# Patient Record
Sex: FEMALE | NOT HISPANIC OR LATINO | Employment: FULL TIME | URBAN - METROPOLITAN AREA
[De-identification: names, ages, dates, MRNs, and addresses within clinical notes are randomized per-mention and may not be internally consistent; named-entity substitution may affect disease eponyms.]

---

## 2020-10-20 ENCOUNTER — OFFICE VISIT (OUTPATIENT)
Dept: ENDOCRINOLOGY | Facility: CLINIC | Age: 35
End: 2020-10-20

## 2020-10-20 VITALS
HEIGHT: 61 IN | TEMPERATURE: 97.8 F | OXYGEN SATURATION: 99 % | DIASTOLIC BLOOD PRESSURE: 86 MMHG | BODY MASS INDEX: 32.02 KG/M2 | SYSTOLIC BLOOD PRESSURE: 122 MMHG | WEIGHT: 169.6 LBS | HEART RATE: 114 BPM

## 2020-10-20 DIAGNOSIS — IMO0002 DIABETES MELLITUS TYPE 2, UNCONTROLLED, WITH COMPLICATIONS: Primary | ICD-10-CM

## 2020-10-20 LAB — HBA1C MFR BLD: 8.5 %

## 2020-10-20 PROCEDURE — 99213 OFFICE O/P EST LOW 20 MIN: CPT | Performed by: INTERNAL MEDICINE

## 2020-10-20 PROCEDURE — 83036 HEMOGLOBIN GLYCOSYLATED A1C: CPT | Performed by: INTERNAL MEDICINE

## 2020-10-20 RX ORDER — INSULIN GLARGINE AND LIXISENATIDE 100; 33 U/ML; UG/ML
60 INJECTION, SOLUTION SUBCUTANEOUS DAILY
Qty: 3 PEN | Refills: 5 | Status: SHIPPED | OUTPATIENT
Start: 2020-10-20 | End: 2021-01-25

## 2020-10-20 RX ORDER — DAPAGLIFLOZIN 10 MG/1
1 TABLET, FILM COATED ORAL DAILY
Qty: 30 TABLET | Refills: 5 | Status: SHIPPED | OUTPATIENT
Start: 2020-10-20 | End: 2021-01-25

## 2020-10-20 RX ORDER — DAPAGLIFLOZIN 10 MG/1
1 TABLET, FILM COATED ORAL DAILY
COMMUNITY
Start: 2020-08-25 | End: 2020-10-20 | Stop reason: SDUPTHER

## 2020-10-20 RX ORDER — INSULIN GLARGINE AND LIXISENATIDE 100; 33 U/ML; UG/ML
INJECTION, SOLUTION SUBCUTANEOUS
COMMUNITY
Start: 2020-09-24 | End: 2020-10-20 | Stop reason: SDUPTHER

## 2020-10-20 NOTE — ASSESSMENT & PLAN NOTE
.a1c is not at goal. She made changes in her diet plan about 3 weeks ago and her numbers are better since then. Let's see how that works

## 2020-10-20 NOTE — PROGRESS NOTES
"     Office Note      Date: 10/20/2020  Patient Name: Lamar Webster  MRN: [id number]  : 1985    Chief Complaint   Patient presents with   • Follow-up   • Diabetes       History of Present Illness:   Lamar Webster is a 34 y.o. female who presents for Diabetes type 2. Diagnosed in: . Treated in past with oral agents. Current treatments: soliqua an dfarxiga . Number of insulin shots per day: 1. Checks blood sugar 4 times a day. Has low blood sugar: rare.     Last A1c:  Hemoglobin A1C   Date Value Ref Range Status   10/20/2020 8.5 % Final       Changes in health since last visit: none . Last eye exam due .    Subjective      Diabetic Complications:  Eyes: No  Kidneys: No  Feet: No  Heart: No    Diet and Exercise:  Meals per day: 3  Minutes of exercise per week: 150 mins.    Review of Systems:   Review of Systems   Constitutional: Negative.    HENT: Negative.    Eyes: Negative.    Respiratory: Negative.    Cardiovascular: Negative.    Gastrointestinal: Negative.    Endocrine: Negative.    Genitourinary: Negative.    Musculoskeletal: Negative.    Skin: Negative.    Allergic/Immunologic: Negative.    Neurological: Negative.    Hematological: Negative.    Psychiatric/Behavioral: Negative.        The following portions of the patient's history were reviewed and updated as appropriate: allergies, current medications, past family history, past medical history, past social history, past surgical history and problem list.    Objective     Visit Vitals  /86 (BP Location: Left arm, Patient Position: Sitting, Cuff Size: Adult)   Pulse 114   Temp 97.8 °F (36.6 °C)   Ht 154.9 cm (61\")   Wt 76.9 kg (169 lb 9.6 oz)   SpO2 99%   BMI 32.05 kg/m²       Labs:    CMP  No results found for: GLUCOSE, BUN, CREATININE, EGFRIFNONA, EGFRIFAFRI, BCR, K, CO2, CALCIUM, PROTENTOTREF, LABIL2, BILIRUBIN, AST, ALT     CBC w/DIFF  No results found for: WBC, RBC, HGB, HCT, MCV, MCH, MCHC, RDW, RDWSD, MPV, PLT, NEUTRORELPCT, " LYMPHORELPCT, MONORELPCT, EOSRELPCT, BASORELPCT, AUTOIGPER, NEUTROABS, LYMPHSABS, MONOSABS, EOSABS, BASOSABS, AUTOIGNUM, NRBC    Physical Exam:  Physical Exam  Constitutional:       Appearance: Normal appearance.   HENT:      Head: Normocephalic and atraumatic.   Neurological:      General: No focal deficit present.      Mental Status: She is alert and oriented to person, place, and time. Mental status is at baseline.   Psychiatric:         Mood and Affect: Mood normal.         Thought Content: Thought content normal.         Judgment: Judgment normal.          Assessment / Plan      Assessment & Plan:  Problem List Items Addressed This Visit        Endocrine    Diabetes mellitus type 2, uncontrolled, with complications (CMS/[facility]) - Primary    Current Assessment & Plan     .a1c is not at goal. She made changes in her diet plan about 3 weeks ago and her numbers are better since then. Let's see how that works          Relevant Medications    Farxiga 10 MG tablet    Soliqua 100-33 UNT-MCG/ML solution pen-injector injection    Other Relevant Orders    POC Glycosylated Hemoglobin (Hb A1C) (Completed)           Bharathi Powell MD   10/20/2020

## 2020-11-02 RX ORDER — BLOOD-GLUCOSE METER
KIT MISCELLANEOUS
Qty: 60 EACH | Refills: 5 | Status: SHIPPED | OUTPATIENT
Start: 2020-11-02 | End: 2021-04-12

## 2020-11-20 ENCOUNTER — TELEPHONE (OUTPATIENT)
Dept: ENDOCRINOLOGY | Facility: CLINIC | Age: 35
End: 2020-11-20

## 2020-11-20 NOTE — TELEPHONE ENCOUNTER
ALDEN FROM [facility] [facility] [employer] STATED PA HAS BEEN DELETED FOR FARXIGA FOR PT. PLEASE CALL [phone] REF: [id number].

## 2020-12-10 ENCOUNTER — TELEPHONE (OUTPATIENT)
Dept: ENDOCRINOLOGY | Facility: CLINIC | Age: 35
End: 2020-12-10

## 2021-01-22 ENCOUNTER — TELEPHONE (OUTPATIENT)
Dept: ENDOCRINOLOGY | Facility: CLINIC | Age: 36
End: 2021-01-22

## 2021-01-22 NOTE — TELEPHONE ENCOUNTER
Deniedon January 21  Your PA request has been denied. Additional information will be provided in the denial communication. (Message 1115) Your PA request has been denied. Additional information will be provided in the denial communication. (Message 1115)  Drug  Soliqua 100-33UNT-MCG/ML pen-injectors

## 2021-01-25 ENCOUNTER — OFFICE VISIT (OUTPATIENT)
Dept: ENDOCRINOLOGY | Facility: CLINIC | Age: 36
End: 2021-01-25

## 2021-01-25 VITALS
WEIGHT: 160 LBS | DIASTOLIC BLOOD PRESSURE: 78 MMHG | HEIGHT: 61 IN | OXYGEN SATURATION: 98 % | TEMPERATURE: 97.5 F | HEART RATE: 98 BPM | BODY MASS INDEX: 30.21 KG/M2 | SYSTOLIC BLOOD PRESSURE: 126 MMHG

## 2021-01-25 DIAGNOSIS — IMO0002 DIABETES MELLITUS TYPE 2, UNCONTROLLED, WITH COMPLICATIONS: Primary | ICD-10-CM

## 2021-01-25 LAB
EXPIRATION DATE: NORMAL
HBA1C MFR BLD: 6.4 %
Lab: NORMAL

## 2021-01-25 PROCEDURE — 99213 OFFICE O/P EST LOW 20 MIN: CPT | Performed by: INTERNAL MEDICINE

## 2021-01-25 PROCEDURE — 83036 HEMOGLOBIN GLYCOSYLATED A1C: CPT | Performed by: INTERNAL MEDICINE

## 2021-01-25 RX ORDER — DAPAGLIFLOZIN 10 MG/1
1 TABLET, FILM COATED ORAL DAILY
Qty: 30 TABLET | Refills: 5 | Status: SHIPPED | OUTPATIENT
Start: 2021-01-25 | End: 2021-08-06 | Stop reason: SDUPTHER

## 2021-01-25 NOTE — PROGRESS NOTES
"     Office Note      Date: 2021  Patient Name: Lamar Webster  MRN: [id number]  : 1985    Chief Complaint   Patient presents with   • Diabetes       History of Present Illness:   Lamar Webster is a 35 y.o. female who presents for Diabetes - she is on soliqua and farxiga.  She is being really strict with her diet and is not really needing soliqua every day.  Takes it maybe 3 days per week  She is taking farxiga every day.  Insurance is giving her a hard time with both of these       Last A1c:  Hemoglobin A1C   Date Value Ref Range Status   2021 6.4 % Final       Changes in health since last visit: following her diet closely . Last eye- check is due     Subjective          Review of Systems:   Review of Systems   Constitutional: Negative.    HENT: Negative.    Eyes: Negative.    Respiratory: Negative.    All other systems reviewed and are negative.      The following portions of the patient's history were reviewed and updated as appropriate: allergies, current medications, past family history, past medical history, past social history, past surgical history and problem list.    Objective     Visit Vitals  /78 (BP Location: Left arm, Patient Position: Sitting, Cuff Size: Adult)   Pulse 98   Temp 97.5 °F (36.4 °C) (Infrared)   Ht 154.9 cm (61\")   Wt 72.6 kg (160 lb)   SpO2 98%   BMI 30.23 kg/m²       Labs:    CMP  No results found for: GLUCOSE, BUN, CREATININE, EGFRIFNONA, EGFRIFAFRI, BCR, K, CO2, CALCIUM, PROTENTOTREF, LABIL2, BILIRUBIN, AST, ALT     CBC w/DIFF  No results found for: WBC, RBC, HGB, HCT, MCV, MCH, MCHC, RDW, RDWSD, MPV, PLT, NEUTRORELPCT, LYMPHORELPCT, MONORELPCT, EOSRELPCT, BASORELPCT, AUTOIGPER, NEUTROABS, LYMPHSABS, MONOSABS, EOSABS, BASOSABS, AUTOIGNUM, NRBC    Physical Exam:  Physical Exam  Vitals signs reviewed.   Constitutional:       Appearance: Normal appearance.   HENT:      Head: Normocephalic and atraumatic.   Neurological:      Mental Status: She is alert. "   Psychiatric:         Mood and Affect: Mood normal.         Thought Content: Thought content normal.         Judgment: Judgment normal.          Assessment / Plan      Assessment & Plan:  Problem List Items Addressed This Visit        Other    Diabetes mellitus type 2, uncontrolled, with complications (CMS/[facility]) - Primary    Current Assessment & Plan      Diabetes is much improved.  Will change meds since her diet is so good          Relevant Medications    Dapagliflozin Propanediol (Farxiga) 10 MG tablet    Semaglutide, 1 MG/DOSE, (OZEMPIC) 2 MG/1.5ML solution pen-injector    Other Relevant Orders    POC Glycosylated Hemoglobin (Hb A1C) (Completed)           Bharathi Powell MD   01/25/2021

## 2021-01-26 RX ORDER — SEMAGLUTIDE 1.34 MG/ML
0.5 INJECTION, SOLUTION SUBCUTANEOUS WEEKLY
Qty: 2 ML | Refills: 2 | Status: SHIPPED | OUTPATIENT
Start: 2021-01-26 | End: 2021-04-19

## 2021-01-26 NOTE — TELEPHONE ENCOUNTER
MARIELOS CALLED REGARDING RX FOR OZEMPIC. THE 1MG/ DOSE WAS CALLED IN BUT THE DIRECTIONS ARE FOR THE .5/ DOSE. PLEASE SEND IN RX WITH CORRECT DOSE.

## 2021-04-12 RX ORDER — BLOOD-GLUCOSE METER
KIT MISCELLANEOUS
Qty: 400 EACH | Refills: 1 | Status: SHIPPED | OUTPATIENT
Start: 2021-04-12

## 2021-04-19 RX ORDER — SEMAGLUTIDE 1.34 MG/ML
INJECTION, SOLUTION SUBCUTANEOUS
Qty: 1.5 ML | Refills: 5 | Status: SHIPPED | OUTPATIENT
Start: 2021-04-19 | End: 2021-10-04

## 2021-08-06 DIAGNOSIS — IMO0002 DIABETES MELLITUS TYPE 2, UNCONTROLLED, WITH COMPLICATIONS: ICD-10-CM

## 2021-08-06 RX ORDER — DAPAGLIFLOZIN 10 MG/1
1 TABLET, FILM COATED ORAL DAILY
Qty: 30 TABLET | Refills: 2 | Status: SHIPPED | OUTPATIENT
Start: 2021-08-06 | End: 2021-11-29

## 2021-08-06 NOTE — TELEPHONE ENCOUNTER
PT CALLED REQUESTING A REFILL OF FARXIGA TO BE SENT IN TO [facility]  PLEASE AND THANK YOU

## 2021-10-04 RX ORDER — SEMAGLUTIDE 1.34 MG/ML
INJECTION, SOLUTION SUBCUTANEOUS
Qty: 1.5 ML | Refills: 0 | Status: SHIPPED | OUTPATIENT
Start: 2021-10-04 | End: 2021-11-01

## 2021-11-01 RX ORDER — SEMAGLUTIDE 1.34 MG/ML
0.5 INJECTION, SOLUTION SUBCUTANEOUS WEEKLY
Qty: 1.5 ML | Refills: 1 | Status: SHIPPED | OUTPATIENT
Start: 2021-11-01 | End: 2021-12-27

## 2021-11-27 DIAGNOSIS — IMO0002 DIABETES MELLITUS TYPE 2, UNCONTROLLED, WITH COMPLICATIONS: ICD-10-CM

## 2021-11-29 RX ORDER — DAPAGLIFLOZIN 10 MG/1
TABLET, FILM COATED ORAL
Qty: 30 TABLET | Refills: 2 | Status: SHIPPED | OUTPATIENT
Start: 2021-11-29 | End: 2022-02-17

## 2021-12-27 RX ORDER — SEMAGLUTIDE 1.34 MG/ML
INJECTION, SOLUTION SUBCUTANEOUS
Qty: 1.5 ML | Refills: 0 | Status: SHIPPED | OUTPATIENT
Start: 2021-12-27 | End: 2022-01-21

## 2022-01-18 ENCOUNTER — TELEPHONE (OUTPATIENT)
Dept: ENDOCRINOLOGY | Facility: CLINIC | Age: 37
End: 2022-01-18

## 2022-01-21 RX ORDER — SEMAGLUTIDE 1.34 MG/ML
INJECTION, SOLUTION SUBCUTANEOUS
Qty: 1.5 ML | Refills: 0 | Status: SHIPPED | OUTPATIENT
Start: 2022-01-21 | End: 2022-02-17 | Stop reason: SDUPTHER

## 2022-02-17 ENCOUNTER — OFFICE VISIT (OUTPATIENT)
Dept: ENDOCRINOLOGY | Facility: CLINIC | Age: 37
End: 2022-02-17

## 2022-02-17 VITALS
SYSTOLIC BLOOD PRESSURE: 116 MMHG | BODY MASS INDEX: 31.15 KG/M2 | OXYGEN SATURATION: 97 % | DIASTOLIC BLOOD PRESSURE: 79 MMHG | HEART RATE: 98 BPM | WEIGHT: 165 LBS | HEIGHT: 61 IN

## 2022-02-17 DIAGNOSIS — E11.65 UNCONTROLLED TYPE 2 DIABETES MELLITUS WITH HYPERGLYCEMIA: Primary | ICD-10-CM

## 2022-02-17 LAB
EXPIRATION DATE: ABNORMAL
EXPIRATION DATE: NORMAL
GLUCOSE BLDC GLUCOMTR-MCNC: 144 MG/DL (ref 70–130)
HBA1C MFR BLD: 8.5 %
Lab: ABNORMAL
Lab: NORMAL

## 2022-02-17 PROCEDURE — 83036 HEMOGLOBIN GLYCOSYLATED A1C: CPT | Performed by: INTERNAL MEDICINE

## 2022-02-17 PROCEDURE — 82947 ASSAY GLUCOSE BLOOD QUANT: CPT | Performed by: INTERNAL MEDICINE

## 2022-02-17 PROCEDURE — 99214 OFFICE O/P EST MOD 30 MIN: CPT | Performed by: INTERNAL MEDICINE

## 2022-02-17 RX ORDER — INSULIN GLARGINE 100 [IU]/ML
30 INJECTION, SOLUTION SUBCUTANEOUS NIGHTLY
Qty: 15 ML | Refills: 3 | Status: SHIPPED | OUTPATIENT
Start: 2022-02-17 | End: 2022-07-06

## 2022-02-17 RX ORDER — INSULIN GLARGINE 100 [IU]/ML
INJECTION, SOLUTION SUBCUTANEOUS
COMMUNITY
Start: 2022-01-10 | End: 2022-02-17 | Stop reason: SDUPTHER

## 2022-02-17 RX ORDER — SEMAGLUTIDE 1.34 MG/ML
0.5 INJECTION, SOLUTION SUBCUTANEOUS WEEKLY
Qty: 1.5 ML | Refills: 5 | Status: SHIPPED | OUTPATIENT
Start: 2022-02-17 | End: 2022-07-13

## 2022-02-17 RX ORDER — DIPHENHYDRAMINE HYDROCHLORIDE 25 MG/1
5 TABLET ORAL DAILY
COMMUNITY

## 2022-02-17 NOTE — ASSESSMENT & PLAN NOTE
Diabetes is worsening.   i feel ok with resuming sglt2 but only if she is on insulin and monitors ketones   Diabetes will be reassessed in 3 months.

## 2022-02-17 NOTE — PROGRESS NOTES
"     Office Note      Date: 2022  Patient Name: Lamar Webster  MRN: [id number]  : 1985    Chief Complaint   Patient presents with   • Diabetes       History of Present Illness:   Lamar Webster is a 36 y.o. female who presents for Diabetes - type 2.   she had been doing well with farxiga and ozempic  But then ended up in the hospital with normoglycemic DKA. She was in the hospital for 3 days. Sent home on insulin and ozempic but her blood sugars are still high   Last A1c:6.4  Hemoglobin A1C   Date Value Ref Range Status   2022 8.5 % Final       Changes in health since last visit: see above . Last eye exam up to date.    Subjective          Review of Systems:   Review of Systems   Constitutional: Positive for unexpected weight change.       The following portions of the patient's history were reviewed and updated as appropriate: allergies, current medications, past family history, past medical history, past social history, past surgical history and problem list.    Objective     Visit Vitals  /79   Pulse 98   Ht 154.9 cm (61\")   Wt 74.8 kg (165 lb)   SpO2 97%   BMI 31.18 kg/m²       Labs:    CMP  No results found for: GLUCOSE, BUN, CREATININE, EGFRIFNONA, EGFRIFAFRI, BCR, K, CO2, CALCIUM, PROTENTOTREF, LABIL2, BILIRUBIN, AST, ALT     CBC w/DIFF  No results found for: WBC, RBC, HGB, HCT, MCV, MCH, MCHC, RDW, RDWSD, MPV, PLT, NEUTRORELPCT, LYMPHORELPCT, MONORELPCT, EOSRELPCT, BASORELPCT, AUTOIGPER, NEUTROABS, LYMPHSABS, MONOSABS, EOSABS, BASOSABS, AUTOIGNUM, NRBC    Physical Exam:  Physical Exam  Constitutional:       Appearance: Normal appearance.   Neurological:      Mental Status: She is alert.   Psychiatric:         Mood and Affect: Mood normal.         Thought Content: Thought content normal.         Judgment: Judgment normal.          Assessment / Plan      Assessment & Plan:  Problem List Items Addressed This Visit        Other    Uncontrolled type 2 diabetes mellitus with hyperglycemia " ([facility]) - Primary    Current Assessment & Plan     Diabetes is worsening.   i feel ok with resuming sglt2 but only if she is on insulin and monitors ketones   Diabetes will be reassessed in 3 months.         Relevant Medications    empagliflozin (Jardiance) 10 MG tablet tablet    Insulin Glargine (BASAGLAR KWIKPEN) 100 UNIT/ML injection pen    Semaglutide,0.25 or 0.5MG/DOS, (Ozempic, 0.25 or 0.5 MG/DOSE,) 2 MG/1.5ML solution pen-injector    Other Relevant Orders    POC Glucose, Blood (Completed)    POC Glycosylated Hemoglobin (Hb A1C) (Completed)           Bharathi Powell MD   02/17/2022

## 2022-03-01 ENCOUNTER — PRIOR AUTHORIZATION (OUTPATIENT)
Dept: ENDOCRINOLOGY | Facility: CLINIC | Age: 37
End: 2022-03-01

## 2022-03-01 RX ORDER — SEMAGLUTIDE 1.34 MG/ML
INJECTION, SOLUTION SUBCUTANEOUS
Qty: 1.5 ML | Refills: 5 | OUTPATIENT
Start: 2022-03-01

## 2022-03-01 NOTE — TELEPHONE ENCOUNTER
PT CALLED STATING THAT JARDIANCE REQUIRES A PA. PLEASE LOOK INTO THIS. THANK YOU    PT STATED SHE DOES NOT NEED A REFILL OF OZEMPIC SENT IN.

## 2022-03-01 NOTE — TELEPHONE ENCOUNTER
AVILA HARRY Key: [id number] - PA Case ID: [id number] - Rx #: [id number] help? Call us at [phone]  Outcome  Approvedtoday  Your PA request has been approved. Additional information will be provided in the approval communication. (Message 1120)  Drug  Jardiance 10MG tablets  Form  [employer] Electronic PA Form (2017 NCPDP)  Original Claim Info  76 STEP THERAPY CRITERIA NOT MET.MUST USE METFORMIN THERAPY FIRST FORTYPE 2 DIABETES - FOR OVERRIDE HAVE [facility] [phone] REQUIRES PRIOR AUTHORIZATION

## 2022-03-15 ENCOUNTER — TELEPHONE (OUTPATIENT)
Dept: ENDOCRINOLOGY | Facility: CLINIC | Age: 37
End: 2022-03-15

## 2022-05-26 ENCOUNTER — OFFICE VISIT (OUTPATIENT)
Dept: ENDOCRINOLOGY | Facility: CLINIC | Age: 37
End: 2022-05-26

## 2022-05-26 VITALS
SYSTOLIC BLOOD PRESSURE: 114 MMHG | WEIGHT: 168 LBS | OXYGEN SATURATION: 100 % | BODY MASS INDEX: 31.72 KG/M2 | DIASTOLIC BLOOD PRESSURE: 73 MMHG | HEIGHT: 61 IN | HEART RATE: 98 BPM

## 2022-05-26 DIAGNOSIS — E11.65 UNCONTROLLED TYPE 2 DIABETES MELLITUS WITH HYPERGLYCEMIA: Primary | ICD-10-CM

## 2022-05-26 LAB
EXPIRATION DATE: ABNORMAL
EXPIRATION DATE: NORMAL
GLUCOSE BLDC GLUCOMTR-MCNC: 223 MG/DL (ref 70–130)
HBA1C MFR BLD: 7.8 %
Lab: ABNORMAL
Lab: NORMAL

## 2022-05-26 PROCEDURE — 99213 OFFICE O/P EST LOW 20 MIN: CPT | Performed by: INTERNAL MEDICINE

## 2022-05-26 PROCEDURE — 83036 HEMOGLOBIN GLYCOSYLATED A1C: CPT | Performed by: INTERNAL MEDICINE

## 2022-05-26 PROCEDURE — 82947 ASSAY GLUCOSE BLOOD QUANT: CPT | Performed by: INTERNAL MEDICINE

## 2022-05-26 NOTE — PROGRESS NOTES
"     Office Note      Date: 2022  Patient Name: Lamar Webster  MRN: [id number]  : 1985    Chief Complaint   Patient presents with   • Diabetes       History of Present Illness:   Lamar Webster is a 36 y.o. female who presents for Diabetes - ON SGLT2, GLP1 AND INSULIN  HAD COVID IN MARCH.    BG CHECKS ARE CHECKED SOMETIMES  WILL DO BETTER ONCE SCHOOL  IS OUT.  NO SERIOUS HYPOS        Last A1c:  Hemoglobin A1C   Date Value Ref Range Status   2022 7.8 % Final   2022 8.5 (H) <5.7 % Final       Changes in health since last visit: SEE ABOVE . Last eye exam SUMMER 2021.    Subjective        Review of Systems:   Review of Systems   Constitutional: Negative.    HENT: Negative.    Respiratory: Negative.        The following portions of the patient's history were reviewed and updated as appropriate: allergies, current medications, past family history, past medical history, past social history, past surgical history and problem list.    Objective     Visit Vitals  /73   Pulse 98   Ht 154.9 cm (61\")   Wt 76.2 kg (168 lb)   SpO2 100%   BMI 31.74 kg/m²       Labs:    CMP  Lab Results   Component Value Date    GLUCOSE 134 (H) 2022    BUN 3 (L) 01/10/2022    CREATININE 0.35 (L) 01/10/2022    EGFRIFNONA >60 01/10/2022    EGFRIFAFRI >60 01/10/2022    BCR 9 01/10/2022    K 4.0 01/10/2022    CO2 21 (L) 01/10/2022    CALCIUM 8.5 (L) 01/10/2022    AST 7 (L) 2022    ALT 6 (L) 2022        CBC w/DIFF  Lab Results   Component Value Date    WBC 7.30 2022    RBC 4.07 2022    HGB 12.8 2022    HCT 36.5 2022    MCV 90 2022    MCH 31.4 2022    MCHC 35.1 2022    RDW 12.3 2022    MPV 8.9 2022     2022    NEUTRORELPCT 55.0 2022    LYMPHORELPCT 37.0 2022    MONORELPCT 7.0 2022    EOSRELPCT 1.0 2022    BASORELPCT 0.0 2022    AUTOIGPER 0.0 2022    NEUTROABS 3.96 2022    LYMPHSABS 2.70 2022 "    MONOSABS 0.52 01/09/2022    EOSABS 0.08 01/09/2022    BASOSABS 0.02 01/09/2022    AUTOIGNUM 0.02 01/09/2022    NRBC 0.0 01/09/2022       Physical Exam:  Physical Exam  Vitals reviewed.   Constitutional:       Appearance: Normal appearance. She is normal weight.   Cardiovascular:      Pulses:           Dorsalis pedis pulses are 2+ on the right side and 2+ on the left side.        Posterior tibial pulses are 2+ on the right side and 2+ on the left side.   Musculoskeletal:      Right foot: Normal range of motion. No deformity, bunion, Charcot foot, foot drop or prominent metatarsal heads.      Left foot: Normal range of motion. No deformity, bunion, Charcot foot, foot drop or prominent metatarsal heads.   Feet:      Right foot:      Protective Sensation: 5 sites tested. 5 sites sensed.      Skin integrity: Skin integrity normal.      Toenail Condition: Right toenails are normal.      Left foot:      Protective Sensation: 5 sites tested. 5 sites sensed.      Skin integrity: Skin integrity normal.      Toenail Condition: Left toenails are normal.      Comments: Diabetic Foot Exam Performed and Monofilament Test Performed    Neurological:      Mental Status: She is alert.          Assessment / Plan      Assessment & Plan:  Problem List Items Addressed This Visit        Other    Uncontrolled type 2 diabetes mellitus with hyperglycemia (HCC) - Primary    Overview     HAD NORMOGLYCEMIC DKA WITH JARDIANCE WHEN SHE WAS NOT ON INSULIN. HAS NOT HAD IT WHEN ON INSULIN            Current Assessment & Plan     Diabetes is improving with treatment.   Continue current treatment regimen.  Diabetes will be reassessed in 6 months.           Relevant Medications    empagliflozin (Jardiance) 10 MG tablet tablet    Insulin Glargine (BASAGLAR KWIKPEN) 100 UNIT/ML injection pen    Semaglutide,0.25 or 0.5MG/DOS, (Ozempic, 0.25 or 0.5 MG/DOSE,) 2 MG/1.5ML solution pen-injector    Other Relevant Orders    POC Glucose, Blood (Completed)     POC Glycosylated Hemoglobin (Hb A1C) (Completed)           Bharathi Powell MD   05/26/2022

## 2022-07-06 RX ORDER — INSULIN GLARGINE 100 [IU]/ML
INJECTION, SOLUTION SUBCUTANEOUS
Qty: 15 ML | Refills: 3 | Status: SHIPPED | OUTPATIENT
Start: 2022-07-06 | End: 2022-12-22

## 2022-07-13 RX ORDER — SEMAGLUTIDE 1.34 MG/ML
INJECTION, SOLUTION SUBCUTANEOUS
Qty: 1.5 ML | Refills: 5 | Status: SHIPPED | OUTPATIENT
Start: 2022-07-13 | End: 2023-02-15 | Stop reason: SDUPTHER

## 2022-07-22 RX ORDER — EMPAGLIFLOZIN 10 MG/1
TABLET, FILM COATED ORAL
Qty: 30 TABLET | Refills: 5 | Status: SHIPPED | OUTPATIENT
Start: 2022-07-22 | End: 2023-01-31

## 2022-12-22 RX ORDER — INSULIN GLARGINE 100 [IU]/ML
INJECTION, SOLUTION SUBCUTANEOUS
Qty: 15 ML | Refills: 3 | Status: SHIPPED | OUTPATIENT
Start: 2022-12-22 | End: 2023-03-21 | Stop reason: SDUPTHER

## 2023-01-31 RX ORDER — EMPAGLIFLOZIN 10 MG/1
TABLET, FILM COATED ORAL
Qty: 30 TABLET | Refills: 0 | Status: SHIPPED | OUTPATIENT
Start: 2023-01-31 | End: 2023-03-13

## 2023-02-15 RX ORDER — SEMAGLUTIDE 1.34 MG/ML
INJECTION, SOLUTION SUBCUTANEOUS
Qty: 1.5 ML | Refills: 1 | Status: SHIPPED | OUTPATIENT
Start: 2023-02-15 | End: 2023-03-21 | Stop reason: DRUGHIGH

## 2023-02-17 ENCOUNTER — PRIOR AUTHORIZATION (OUTPATIENT)
Dept: ENDOCRINOLOGY | Facility: CLINIC | Age: 38
End: 2023-02-17
Payer: COMMERCIAL

## 2023-02-17 NOTE — TELEPHONE ENCOUNTER
AVILA MCDONALD Key: [id number] - PA Case ID: [id number] help? Call us at [phone]  Outcome  Approvedon February 16  Your PA request has been approved. Additional information will be provided in the approval communication. (Message 1145)  Drug  Jardiance 10MG tablets  Form  "[employer] Electronic PA Form (2017 NCPDP)

## 2023-03-13 RX ORDER — EMPAGLIFLOZIN 10 MG/1
TABLET, FILM COATED ORAL
Qty: 30 TABLET | Refills: 0 | Status: SHIPPED | OUTPATIENT
Start: 2023-03-13 | End: 2023-03-21 | Stop reason: SDUPTHER

## 2023-03-21 ENCOUNTER — OFFICE VISIT (OUTPATIENT)
Dept: ENDOCRINOLOGY | Facility: CLINIC | Age: 38
End: 2023-03-21
Payer: COMMERCIAL

## 2023-03-21 VITALS
SYSTOLIC BLOOD PRESSURE: 118 MMHG | HEART RATE: 96 BPM | BODY MASS INDEX: 31.72 KG/M2 | DIASTOLIC BLOOD PRESSURE: 78 MMHG | HEIGHT: 61 IN | OXYGEN SATURATION: 98 % | WEIGHT: 168 LBS

## 2023-03-21 DIAGNOSIS — E11.65 UNCONTROLLED TYPE 2 DIABETES MELLITUS WITH HYPERGLYCEMIA: Primary | ICD-10-CM

## 2023-03-21 LAB
EXPIRATION DATE: ABNORMAL
EXPIRATION DATE: NORMAL
GLUCOSE BLDC GLUCOMTR-MCNC: 136 MG/DL (ref 70–130)
HBA1C MFR BLD: 8.2 %
Lab: ABNORMAL
Lab: NORMAL

## 2023-03-21 PROCEDURE — 83036 HEMOGLOBIN GLYCOSYLATED A1C: CPT | Performed by: INTERNAL MEDICINE

## 2023-03-21 PROCEDURE — 82570 ASSAY OF URINE CREATININE: CPT | Performed by: INTERNAL MEDICINE

## 2023-03-21 PROCEDURE — 99214 OFFICE O/P EST MOD 30 MIN: CPT | Performed by: INTERNAL MEDICINE

## 2023-03-21 PROCEDURE — 82947 ASSAY GLUCOSE BLOOD QUANT: CPT | Performed by: INTERNAL MEDICINE

## 2023-03-21 PROCEDURE — 82043 UR ALBUMIN QUANTITATIVE: CPT | Performed by: INTERNAL MEDICINE

## 2023-03-21 RX ORDER — INSULIN GLARGINE 100 [IU]/ML
INJECTION, SOLUTION SUBCUTANEOUS
Qty: 15 ML | Refills: 3 | Status: SHIPPED | OUTPATIENT
Start: 2023-03-21

## 2023-03-21 RX ORDER — SEMAGLUTIDE 1.34 MG/ML
1 INJECTION, SOLUTION SUBCUTANEOUS WEEKLY
Qty: 3 ML | Refills: 11 | Status: SHIPPED | OUTPATIENT
Start: 2023-03-21

## 2023-03-21 NOTE — PROGRESS NOTES
"     Office Note      Date: 2023  Patient Name: Lamar Webster  MRN: [id number]  : 1985    Chief Complaint   Patient presents with   • Diabetes       History of Present Illness:   Lamar Webster is a 37 y.o. female who presents for Diabetes type 2.   Current RX insulin, glp1 and sglt2      Bg checks are done:twice daily   Hypoglycemia : none       Last A1c:  Hemoglobin A1C   Date Value Ref Range Status   2023 8.2 % Final   2022 8.5 (H) <5.7 % Final       Changes in health since last visit: none . Last eye exam summer 2022.    Subjective              Review of Systems:   Review of Systems   Constitutional: Negative.    HENT: Negative.    Eyes: Negative.    Respiratory: Negative.    All other systems reviewed and are negative.      The following portions of the patient's history were reviewed and updated as appropriate: allergies, current medications, past family history, past medical history, past social history, past surgical history and problem list.    Objective     Visit Vitals  /78   Pulse 96   Ht 154.9 cm (61\")   Wt 76.2 kg (168 lb)   SpO2 98%   BMI 31.74 kg/m²           Physical Exam:  Physical Exam  Vitals reviewed.   Constitutional:       Appearance: Normal appearance.   Neurological:      Mental Status: She is alert.   Psychiatric:         Mood and Affect: Mood normal.         Behavior: Behavior normal.         Thought Content: Thought content normal.         Judgment: Judgment normal.          Assessment / Plan      Assessment & Plan:  Problem List Items Addressed This Visit        Other    Uncontrolled type 2 diabetes mellitus with hyperglycemia (HCC) - Primary    Overview     HAD NORMOGLYCEMIC DKA WITH JARDIANCE WHEN SHE WAS NOT ON INSULIN. HAS NOT HAD IT WHEN ON INSULIN          Current Assessment & Plan     Deteriorated.  Due to lack of meds due to ozempic shortage.  Will increase ozempic to 1 mg          Relevant Medications    Semaglutide, 1 MG/DOSE, (Ozempic, 1 " MG/DOSE,) 4 MG/3ML solution pen-injector    Insulin Glargine (BASAGLAR KWIKPEN) 100 UNIT/ML injection pen    empagliflozin (Jardiance) 10 MG tablet tablet    Other Relevant Orders    POC Glucose, Blood (Completed)    POC Glycosylated Hemoglobin (Hb A1C) (Completed)    Microalbumin / Creatinine Urine Ratio - Urine, Clean Catch        Bharathi Powell MD   03/21/2023

## 2023-03-22 LAB
ALBUMIN UR-MCNC: 3.3 MG/DL
CREAT UR-MCNC: 47.2 MG/DL
MICROALBUMIN/CREAT UR: 69.9 MG/G

## 2023-09-18 RX ORDER — EMPAGLIFLOZIN 10 MG/1
TABLET, FILM COATED ORAL
Qty: 30 TABLET | Refills: 0 | Status: SHIPPED | OUTPATIENT
Start: 2023-09-18

## 2023-09-18 NOTE — TELEPHONE ENCOUNTER
Rx Refill Note  Requested Prescriptions     Pending Prescriptions Disp Refills    Jardiance 10 MG tablet tablet [Pharmacy Med Name: JARDIANCE 10MG TABLETS] 30 tablet 5     Sig: TAKE 1 TABLET BY MOUTH DAILY          Last office visit with prescribing clinician: 3/21/2023     Next office visit with prescribing clinician: 9/21/2023         Destiny Phan MA  09/18/23, 08:41 EDT

## 2023-10-03 ENCOUNTER — TELEPHONE (OUTPATIENT)
Dept: ENDOCRINOLOGY | Facility: CLINIC | Age: 38
End: 2023-10-03

## 2023-10-03 NOTE — TELEPHONE ENCOUNTER
PATIENT HAS BEEN OUT OF OZEMPIC FOR 4 WEEKS DUE TO IT IS ON BACK ORDER AND IS NOT ABLE TO FIND A PHARMACY THAT HAS IT IN STOCK SHE WANTS TO KNOW WHAT DR. MARQUEZ SUGGEST OR REPLACE THIS MEDICATION WITH SOMETHING THAT IS AVAILABLE. PHONE NUMBER [phone]

## 2023-10-06 RX ORDER — TIRZEPATIDE 2.5 MG/.5ML
0.5 INJECTION, SOLUTION SUBCUTANEOUS WEEKLY
Qty: 2 ML | Refills: 0 | Status: SHIPPED | OUTPATIENT
Start: 2023-10-06

## 2023-10-23 RX ORDER — EMPAGLIFLOZIN 10 MG/1
TABLET, FILM COATED ORAL
Qty: 30 TABLET | Refills: 0 | Status: SHIPPED | OUTPATIENT
Start: 2023-10-23

## 2023-10-23 NOTE — TELEPHONE ENCOUNTER
Rx Refill Note  Requested Prescriptions     Pending Prescriptions Disp Refills    Jardiance 10 MG tablet tablet [Pharmacy Med Name: JARDIANCE 10MG TABLETS] 30 tablet 0     Sig: TAKE 1 TABLET BY MOUTH DAILY          Last office visit with prescribing clinician: 3/21/2023     Next office visit with prescribing clinician: 1/29/2024         Destiny Phan MA  10/23/23, 09:45 EDT

## 2023-11-13 RX ORDER — TIRZEPATIDE 2.5 MG/.5ML
INJECTION, SOLUTION SUBCUTANEOUS
Qty: 2 ML | Refills: 0 | Status: SHIPPED | OUTPATIENT
Start: 2023-11-13

## 2023-11-13 NOTE — TELEPHONE ENCOUNTER
Rx Refill Note  Requested Prescriptions     Pending Prescriptions Disp Refills    Mounjaro 2.5 MG/0.5ML solution pen-injector [Pharmacy Med Name: MOUNJARO 2.5MG/0.5ML PF PEN INJ] 2 mL 0     Sig: ADMINISTER 2.5 MG UNDER THE SKIN 1 TIME EVERY WEEK AS DIRECTED      Last office visit with prescribing clinician: 3/21/2023     Next office visit with prescribing clinician: 1/29/2024       Marek Mckinney MA  11/13/23, 14:27 EST

## 2023-11-15 RX ORDER — INSULIN GLARGINE 100 [IU]/ML
INJECTION, SOLUTION SUBCUTANEOUS
Qty: 15 ML | Refills: 3 | Status: SHIPPED | OUTPATIENT
Start: 2023-11-15

## 2023-11-15 NOTE — TELEPHONE ENCOUNTER
Rx Refill Note  Requested Prescriptions     Pending Prescriptions Disp Refills    Insulin Glargine (BASAGLAR KWIKPEN) 100 UNIT/ML injection pen [Pharmacy Med Name: BASAGLAR 100 U/ML KWIKPEN INJ 3ML] 15 mL 3     Sig: ADMINISTER 30 UNITS UNDER THE SKIN EVERY NIGHT AS DIRECTED          Last office visit with prescribing clinician: 3/21/2023     Next office visit with prescribing clinician: 1/29/2024         Destiny Phan MA  11/15/23, 10:11 EST

## 2023-11-20 RX ORDER — EMPAGLIFLOZIN 10 MG/1
TABLET, FILM COATED ORAL
Qty: 30 TABLET | Refills: 0 | Status: SHIPPED | OUTPATIENT
Start: 2023-11-20

## 2023-11-20 NOTE — TELEPHONE ENCOUNTER
Rx Refill Note  Requested Prescriptions     Pending Prescriptions Disp Refills    Jardiance 10 MG tablet tablet [Pharmacy Med Name: JARDIANCE 10MG TABLETS] 30 tablet 0     Sig: TAKE 1 TABLET BY MOUTH DAILY      Last office visit with prescribing clinician: 3/21/2023     Next office visit with prescribing clinician: 1/29/2024       Marek Mckinney MA  11/20/23, 13:39 EST

## 2023-12-12 RX ORDER — EMPAGLIFLOZIN 10 MG/1
TABLET, FILM COATED ORAL
Qty: 30 TABLET | Refills: 0 | Status: SHIPPED | OUTPATIENT
Start: 2023-12-12

## 2023-12-12 RX ORDER — TIRZEPATIDE 2.5 MG/.5ML
INJECTION, SOLUTION SUBCUTANEOUS
Qty: 2 ML | Refills: 0 | Status: SHIPPED | OUTPATIENT
Start: 2023-12-12

## 2023-12-12 NOTE — TELEPHONE ENCOUNTER
Rx Refill Note  Requested Prescriptions     Pending Prescriptions Disp Refills    Jardiance 10 MG tablet tablet [Pharmacy Med Name: JARDIANCE 10MG TABLETS] 30 tablet 0     Sig: TAKE 1 TABLET BY MOUTH DAILY          Last office visit with prescribing clinician: 3/21/2023     Next office visit with prescribing clinician: 1/29/2024         Destiny Phan MA  12/12/23, 09:53 EST

## 2023-12-12 NOTE — TELEPHONE ENCOUNTER
Rx Refill Note  Requested Prescriptions     Pending Prescriptions Disp Refills    Mounjaro 2.5 MG/0.5ML solution pen-injector [Pharmacy Med Name: MOUNJARO 2.5MG/0.5ML PF PEN INJ] 2 mL 0     Sig: ADMINISTER 2.5 MG UNDER THE SKIN 1 TIME EVERY WEEK AS DIRECTED          Last office visit with prescribing clinician: 3/21/2023     Next office visit with prescribing clinician: 12/12/2023         Destiny Phan MA  12/12/23, 09:54 EST

## 2023-12-22 RX ORDER — EMPAGLIFLOZIN 10 MG/1
TABLET, FILM COATED ORAL
Qty: 30 TABLET | Refills: 0 | OUTPATIENT
Start: 2023-12-22

## 2024-01-15 RX ORDER — EMPAGLIFLOZIN 10 MG/1
TABLET, FILM COATED ORAL
Qty: 30 TABLET | Refills: 0 | Status: SHIPPED | OUTPATIENT
Start: 2024-01-15 | End: 2024-01-19

## 2024-01-15 NOTE — TELEPHONE ENCOUNTER
Rx Refill Note  Requested Prescriptions     Pending Prescriptions Disp Refills    Jardiance 10 MG tablet tablet [Pharmacy Med Name: JARDIANCE 10MG TABLETS] 30 tablet 0     Sig: TAKE 1 TABLET BY MOUTH DAILY          Last office visit with prescribing clinician: 3/21/2023     Next office visit with prescribing clinician: 1/29/2024         Destiny Phan MA  01/15/24, 11:25 EST

## 2024-01-19 RX ORDER — EMPAGLIFLOZIN 10 MG/1
TABLET, FILM COATED ORAL
Qty: 30 TABLET | Refills: 0 | Status: SHIPPED | OUTPATIENT
Start: 2024-01-19

## 2024-01-19 RX ORDER — TIRZEPATIDE 2.5 MG/.5ML
INJECTION, SOLUTION SUBCUTANEOUS
Qty: 2 ML | Refills: 0 | Status: SHIPPED | OUTPATIENT
Start: 2024-01-19

## 2024-01-19 NOTE — TELEPHONE ENCOUNTER
Rx Refill Note  Requested Prescriptions     Pending Prescriptions Disp Refills    Jardiance 10 MG tablet tablet [Pharmacy Med Name: JARDIANCE 10MG TABLETS] 30 tablet 0     Sig: TAKE 1 TABLET BY MOUTH DAILY      Last office visit with prescribing clinician: 3/21/2023     Next office visit with prescribing clinician: 1/29/2024

## 2024-01-19 NOTE — TELEPHONE ENCOUNTER
Rx Refill Note  Requested Prescriptions     Pending Prescriptions Disp Refills    Mounjaro 2.5 MG/0.5ML solution pen-injector pen [Pharmacy Med Name: MOUNJARO 2.5MG/0.5ML PF PEN INJ] 2 mL 0     Sig: ADMINISTER 2.5 MG UNDER THE SKIN 1 TIME EVERY WEEK AS DIRECTED      Last office visit with prescribing clinician: 3/21/2023   Last telemedicine visit with prescribing clinician: Visit date not found   Next office visit with prescribing clinician: 1/19/2024                         Would you like a call back once the refill request has been completed: [] Yes [] No    If the office needs to give you a call back, can they leave a voicemail: [] Yes [] No    Néstor Lee MA  01/19/24, 12:00 EST

## 2024-01-29 ENCOUNTER — OFFICE VISIT (OUTPATIENT)
Dept: ENDOCRINOLOGY | Facility: CLINIC | Age: 39
End: 2024-01-29
Payer: COMMERCIAL

## 2024-01-29 VITALS
WEIGHT: 170 LBS | DIASTOLIC BLOOD PRESSURE: 76 MMHG | HEIGHT: 61 IN | SYSTOLIC BLOOD PRESSURE: 130 MMHG | BODY MASS INDEX: 32.1 KG/M2 | HEART RATE: 101 BPM | OXYGEN SATURATION: 98 %

## 2024-01-29 DIAGNOSIS — E11.65 UNCONTROLLED TYPE 2 DIABETES MELLITUS WITH HYPERGLYCEMIA: Primary | ICD-10-CM

## 2024-01-29 LAB
EXPIRATION DATE: ABNORMAL
EXPIRATION DATE: NORMAL
GLUCOSE BLDC GLUCOMTR-MCNC: 107 MG/DL (ref 70–130)
HBA1C MFR BLD: 8.9 % (ref 4.5–5.7)
Lab: ABNORMAL
Lab: NORMAL

## 2024-01-29 PROCEDURE — 82947 ASSAY GLUCOSE BLOOD QUANT: CPT | Performed by: INTERNAL MEDICINE

## 2024-01-29 PROCEDURE — 83036 HEMOGLOBIN GLYCOSYLATED A1C: CPT | Performed by: INTERNAL MEDICINE

## 2024-01-29 PROCEDURE — 99214 OFFICE O/P EST MOD 30 MIN: CPT | Performed by: INTERNAL MEDICINE

## 2024-01-29 RX ORDER — INSULIN GLARGINE 100 [IU]/ML
INJECTION, SOLUTION SUBCUTANEOUS
Qty: 21 ML | Refills: 3 | Status: SHIPPED | OUTPATIENT
Start: 2024-01-29

## 2024-01-29 NOTE — ASSESSMENT & PLAN NOTE
A1c today was 8.9  Deteriorated. The plan is to push up the dose of moujnaor every month til we get to 10 mg per weeek

## 2024-01-29 NOTE — PROGRESS NOTES
"     Office Note      Date: 2024  Patient Name: Lamar Webster  MRN: [id number]  : 1985    Chief Complaint   Patient presents with    Diabetes       History of Present Illness:   Lamar Webster is a 38 y.o. female who presents for Diabetes type 2.   Current RX mounjaro and insulin and jardiance    Bg checks are done:twice daily   Hypoglycemia :rare. Variable.       Last A1c:  Hemoglobin A1C   Date Value Ref Range Status   2024 8.9 (A) 4.5 - 5.7 % Final   2022 8.5 (H) <5.7 % Final       Changes in health since last visit: none . Last eye exam  summer 2023.    Subjective         Review of Systems:   Review of Systems   Endocrine: Negative for polydipsia and polyuria.       The following portions of the patient's history were reviewed and updated as appropriate: allergies, current medications, past family history, past medical history, past social history, past surgical history, and problem list.    Objective     Visit Vitals  /76   Pulse 101   Ht 154.9 cm (61\")   Wt 77.1 kg (170 lb)   SpO2 98%   BMI 32.12 kg/m²           Physical Exam:  Physical Exam  Vitals reviewed.   Constitutional:       Appearance: Normal appearance. She is normal weight.   Cardiovascular:      Pulses:           Dorsalis pedis pulses are 2+ on the right side and 2+ on the left side.        Posterior tibial pulses are 2+ on the right side and 2+ on the left side.   Musculoskeletal:      Right foot: Normal range of motion. No deformity, bunion, Charcot foot, foot drop or prominent metatarsal heads.      Left foot: Normal range of motion. No deformity, bunion, Charcot foot, foot drop or prominent metatarsal heads.   Feet:      Right foot:      Protective Sensation: 10 sites tested.  10 sites sensed.      Skin integrity: Skin integrity normal.      Toenail Condition: Right toenails are normal.      Left foot:      Protective Sensation: 10 sites tested.  10 sites sensed.      Skin integrity: Skin integrity normal.      " Toenail Condition: Left toenails are normal.      Comments: Diabetic Foot Exam Performed    Neurological:      Mental Status: She is alert.   Psychiatric:         Mood and Affect: Mood normal.         Behavior: Behavior normal.         Thought Content: Thought content normal.         Judgment: Judgment normal.          Assessment / Plan      Assessment & Plan:  Problem List Items Addressed This Visit          Other    Uncontrolled type 2 diabetes mellitus with hyperglycemia - Primary    Overview     HAD NORMOGLYCEMIC DKA WITH JARDIANCE WHEN SHE WAS NOT ON INSULIN. HAS NOT HAD IT WHEN ON INSULIN          Current Assessment & Plan     A1c today was 8.9  Deteriorated. The plan is to push up the dose of moujnaor every month til we get to 10 mg per weeek          Relevant Medications    Jardiance 10 MG tablet tablet    Tirzepatide (MOUNJARO) 5 MG/0.5ML solution pen-injector pen    Insulin Glargine (BASAGLAR KWIKPEN) 100 UNIT/ML injection pen    Other Relevant Orders    POC Glucose, Blood (Completed)    POC Glycosylated Hemoglobin (Hb A1C) (Completed)         Electronically signed by :Bharathi Powell MD   01/29/2024

## 2024-02-08 ENCOUNTER — PRIOR AUTHORIZATION (OUTPATIENT)
Dept: ENDOCRINOLOGY | Facility: CLINIC | Age: 39
End: 2024-02-08
Payer: COMMERCIAL

## 2024-02-08 NOTE — TELEPHONE ENCOUNTER
AVILA MCDONALD (Key: [id number])  PA Case ID #: [id number]  Need Help? Call us at [phone]  Outcome  Approved today  Your PA request has been approved. Additional information will be provided in the approval communication. (Message 1143)  Authorization Expiration Date: 2/7/2025  Drug  Jardiance 10MG tablets  ePA cloud logo  Form  [facility] Electronic PA Form (2017 N[employer])

## 2024-03-06 DIAGNOSIS — E11.65 UNCONTROLLED TYPE 2 DIABETES MELLITUS WITH HYPERGLYCEMIA: ICD-10-CM

## 2024-03-07 RX ORDER — TIRZEPATIDE 5 MG/.5ML
INJECTION, SOLUTION SUBCUTANEOUS
Qty: 2 ML | Refills: 0 | OUTPATIENT
Start: 2024-03-07

## 2024-03-07 NOTE — TELEPHONE ENCOUNTER
Provider: ALMA    Caller: AVILA MCDONALD    Relationship to Patient: SELF    Pharmacy: [facility]    Phone Number: [phone]    Reason for Call: PATIENT WOULD LIKE TO LET IGNACIO KNOW THAT SHE WOULD LIKE TO  BE SWITCHED FROM 5MG TO 7.5MG OF THE MOUNJARO. PLEASE ADVISE

## 2024-04-03 RX ORDER — EMPAGLIFLOZIN 10 MG/1
TABLET, FILM COATED ORAL
Qty: 30 TABLET | Refills: 0 | Status: SHIPPED | OUTPATIENT
Start: 2024-04-03

## 2024-04-03 NOTE — TELEPHONE ENCOUNTER
Rx Refill Note  Requested Prescriptions     Pending Prescriptions Disp Refills    Jardiance 10 MG tablet tablet [Pharmacy Med Name: JARDIANCE 10MG TABLETS] 30 tablet 0     Sig: TAKE 1 TABLET BY MOUTH DAILY          Last office visit with prescribing clinician: 1/29/2024     Next office visit with prescribing clinician: Visit date not found         Destiny Phan MA  04/03/24, 12:01 EDT

## 2024-04-04 RX ORDER — TIRZEPATIDE 10 MG/.5ML
10 INJECTION, SOLUTION SUBCUTANEOUS WEEKLY
Qty: 2 ML | Refills: 0 | Status: SHIPPED | OUTPATIENT
Start: 2024-04-04

## 2024-04-04 NOTE — TELEPHONE ENCOUNTER
Caller: Lamar Webster    Relationship: Self    Best call back number: [phone]    Which medication are you concerned about: ALYSSA    Who prescribed you this medication: DENISA MARQUEZ    What are your concerns: PATIENT WOULD LIKE INCREASE TO 10 FOR HER MOUNJARO

## 2024-04-18 RX ORDER — INSULIN GLARGINE 100 [IU]/ML
INJECTION, SOLUTION SUBCUTANEOUS
Qty: 15 ML | Refills: 1 | Status: SHIPPED | OUTPATIENT
Start: 2024-04-18

## 2024-04-18 NOTE — TELEPHONE ENCOUNTER
Rx Refill Note  Requested Prescriptions     Pending Prescriptions Disp Refills    Insulin Glargine (BASAGLAR KWIKPEN) 100 UNIT/ML injection pen [Pharmacy Med Name: BASAGLAR 100 U/ML KWIKPEN INJ 3ML] 15 mL      Sig: ADMINISTER 30 UNITS UNDER THE SKIN EVERY NIGHT AS DIRECTED      Last office visit with prescribing clinician: 1/29/2024      Next office visit with prescribing clinician: Visit date not found                  Ruddy Cornelius MA  04/18/24, 09:34 EDT

## 2024-04-24 ENCOUNTER — TELEPHONE (OUTPATIENT)
Dept: ENDOCRINOLOGY | Facility: CLINIC | Age: 39
End: 2024-04-24

## 2024-04-24 NOTE — TELEPHONE ENCOUNTER
Caller: Lamar Webster    Relationship: Self    Best call back number: [phone]    Requested Prescriptions:     Tirzepatide (Mounjaro) 10 MG/0.5ML solution pen-injector pen     Requested Prescriptions      No prescriptions requested or ordered in this encounter        Pharmacy where request should be sent: [facility]- [address] [place] [address]      Last office visit with prescribing clinician: 1/29/2024   Last telemedicine visit with prescribing clinician: Visit date not found   Next office visit with prescribing clinician: Visit date not found     Additional details provided by patient: PT WOULD LIKE A REFILL ON HER MEDICATION BUT WOULD LIKE THE DOSAGE LOWERED TO 7.5MG CAUSE SHE IS HAVING LOOSE STOOLS, VOMITING AND NAUSEOUS FOR THE LAST 2 WEEKS. PT IS ON HER 2ND  PEN. PT HAS 2 WEEKS LEFT BUT WOULD LIKE A LOWER DOSAGE TO BE FILLED SOONER. PT WOULD LIKE MEDICATION FILLED BY NEXT WEEK.     Does the patient have less than a 3 day supply:  [] Yes  [x] No    Would you like a call back once the refill request has been completed: [x] Yes [] No    If the office needs to give you a call back, can they leave a voicemail: [x] Yes [] No    Jaclyn Rogers Rep   04/24/24 09:11 EDT

## 2024-05-07 RX ORDER — EMPAGLIFLOZIN 10 MG/1
TABLET, FILM COATED ORAL
Qty: 30 TABLET | Refills: 0 | Status: SHIPPED | OUTPATIENT
Start: 2024-05-07

## 2024-06-04 RX ORDER — EMPAGLIFLOZIN 10 MG/1
TABLET, FILM COATED ORAL
Qty: 30 TABLET | Refills: 0 | Status: SHIPPED | OUTPATIENT
Start: 2024-06-04

## 2024-06-04 NOTE — TELEPHONE ENCOUNTER
Rx Refill Note  Requested Prescriptions     Pending Prescriptions Disp Refills    Jardiance 10 MG tablet tablet [Pharmacy Med Name: JARDIANCE 10MG TABLETS] 30 tablet 0     Sig: TAKE 1 TABLET BY MOUTH DAILY    Mounjaro 10 MG/0.5ML solution pen-injector pen [Pharmacy Med Name: MOUNJARO 10MG/0.5ML INJ (4 PENS)] 2 mL 0     Sig: ADMINISTER 10 MG UNDER THE SKIN 1 TIME EVERY WEEK AS DIRECTED      Last office visit with prescribing clinician: 1/29/2024     Next office visit with prescribing clinician: Visit date not found

## 2024-07-02 RX ORDER — EMPAGLIFLOZIN 10 MG/1
TABLET, FILM COATED ORAL
Qty: 30 TABLET | Refills: 0 | OUTPATIENT
Start: 2024-07-02

## 2024-07-16 RX ORDER — INSULIN GLARGINE 100 [IU]/ML
INJECTION, SOLUTION SUBCUTANEOUS
Qty: 15 ML | Refills: 1 | Status: SHIPPED | OUTPATIENT
Start: 2024-07-16

## 2024-07-16 NOTE — TELEPHONE ENCOUNTER
Caller: Lamar Webster    Relationship: Self    Best call back number: [phone]     Requested Prescriptions:   Requested Prescriptions     Pending Prescriptions Disp Refills    empagliflozin (Jardiance) 10 MG tablet tablet 30 tablet 0     Sig: Take 1 tablet by mouth Daily.    Tirzepatide (MOUNJARO) 7.5 MG/0.5ML solution pen-injector pen 2 mL 1     Sig: Inject 0.5 mL under the skin into the appropriate area as directed 1 (One) Time Per Week. CALL OFFICE TO SCHEDULE FOLLOW UP FOR FUTURE REFILLS    Insulin Glargine (BASAGLAR KWIKPEN) 100 UNIT/ML injection pen 15 mL 1     Sig: ADMINISTER 30 UNITS UNDER THE SKIN EVERY NIGHT AS DIRECTED        Pharmacy where request should be sent:  [facility]     Last office visit with prescribing clinician: 1/29/2024   Last telemedicine visit with prescribing clinician: Visit date not found   Next office visit with prescribing clinician: 11/27/2024     Additional details provided by patient: MOUNJARO IS TO BE 10MG     Does the patient have less than a 3 day supply:  [] Yes  [x] No    Would you like a call back once the refill request has been completed: [] Yes [x] No    If the office needs to give you a call back, can they leave a voicemail: [] Yes [x] No    Jaclyn Strong   07/16/24 11:42 EDT

## 2024-07-16 NOTE — TELEPHONE ENCOUNTER
Rx Refill Note  Requested Prescriptions     Pending Prescriptions Disp Refills    empagliflozin (Jardiance) 10 MG tablet tablet 30 tablet 4     Sig: Take 1 tablet by mouth Daily.    Tirzepatide (MOUNJARO) 7.5 MG/0.5ML solution pen-injector pen 2 mL 1     Sig: Inject 0.5 mL under the skin into the appropriate area as directed 1 (One) Time Per Week. CALL OFFICE TO SCHEDULE FOLLOW UP FOR FUTURE REFILLS    Insulin Glargine (BASAGLAR KWIKPEN) 100 UNIT/ML injection pen 15 mL 1     Sig: ADMINISTER 30 UNITS UNDER THE SKIN EVERY NIGHT AS DIRECTED      Last office visit with prescribing clinician: 1/29/2024     Next office visit with prescribing clinician: 11/27/2024                           Emily Licea MA  07/16/24, 13:37 EDT

## 2024-10-14 RX ORDER — INSULIN GLARGINE 100 [IU]/ML
INJECTION, SOLUTION SUBCUTANEOUS
Qty: 15 ML | Refills: 1 | Status: SHIPPED | OUTPATIENT
Start: 2024-10-14

## 2024-10-14 NOTE — TELEPHONE ENCOUNTER
Rx Refill Note  Requested Prescriptions     Pending Prescriptions Disp Refills    Insulin Glargine (BASAGLAR KWIKPEN) 100 UNIT/ML injection pen [Pharmacy Med Name: BASAGLAR 100 U/ML KWIKPEN INJ 3ML] 15 mL 1     Sig: ADMINISTER 30 UNITS UNDER THE SKIN EVERY NIGHT AS DIRECTED          Last office visit with prescribing clinician: 1/29/2024     Next office visit with prescribing clinician: 11/27/2024         Destiny Phan MA  10/14/24, 15:14 EDT

## 2024-10-28 ENCOUNTER — PRIOR AUTHORIZATION (OUTPATIENT)
Dept: ENDOCRINOLOGY | Facility: CLINIC | Age: 39
End: 2024-10-28
Payer: COMMERCIAL

## 2024-10-28 NOTE — TELEPHONE ENCOUNTER
AVILA MCDONALD (Key: [id number])  PA Case ID #: [id number]  Need Help? Call us at [phone]  Outcome  [facility]  Your PA request has been approved. Additional information will be provided in the approval communication. (Message 1140)  Authorization Expiration Date: 10/28/2025  Drug  Mounjaro 7.5MG/0.5ML auto-injectors  ePA cloud logo  Form  [employer] Electronic PA Form (2017 NCPDP)

## 2024-11-20 RX ORDER — EMPAGLIFLOZIN 10 MG/1
10 TABLET, FILM COATED ORAL DAILY
Qty: 30 TABLET | Refills: 0 | Status: SHIPPED | OUTPATIENT
Start: 2024-11-20

## 2024-11-20 NOTE — TELEPHONE ENCOUNTER
Rx Refill Note  Requested Prescriptions     Pending Prescriptions Disp Refills    empagliflozin (Jardiance) 10 MG tablet tablet [Pharmacy Med Name: JARDIANCE 10MG TABLETS] 30 tablet 0     Sig: TAKE 1 TABLET BY MOUTH DAILY      Last office visit with prescribing clinician: 1/29/2024   Last telemedicine visit with prescribing clinician: Visit date not found   Next office visit with prescribing clinician: 11/27/2024                         Would you like a call back once the refill request has been completed: [] Yes [] No    If the office needs to give you a call back, can they leave a voicemail: [] Yes [] No    Kaila Troncoso MA  11/20/24, 11:50 EST

## 2024-11-27 ENCOUNTER — OFFICE VISIT (OUTPATIENT)
Age: 39
End: 2024-11-27
Payer: COMMERCIAL

## 2024-11-27 ENCOUNTER — SPECIALTY PHARMACY (OUTPATIENT)
Dept: GENERAL RADIOLOGY | Facility: HOSPITAL | Age: 39
End: 2024-11-27
Payer: COMMERCIAL

## 2024-11-27 VITALS
DIASTOLIC BLOOD PRESSURE: 74 MMHG | WEIGHT: 163 LBS | SYSTOLIC BLOOD PRESSURE: 126 MMHG | HEART RATE: 104 BPM | BODY MASS INDEX: 30.78 KG/M2 | OXYGEN SATURATION: 98 % | HEIGHT: 61 IN

## 2024-11-27 DIAGNOSIS — E11.65 UNCONTROLLED TYPE 2 DIABETES MELLITUS WITH HYPERGLYCEMIA: Primary | ICD-10-CM

## 2024-11-27 LAB
ALBUMIN UR-MCNC: <1.2 MG/DL
CREAT UR-MCNC: 34.3 MG/DL
EXPIRATION DATE: ABNORMAL
EXPIRATION DATE: ABNORMAL
GLUCOSE BLDC GLUCOMTR-MCNC: 213 MG/DL (ref 70–130)
HBA1C MFR BLD: 9 % (ref 4.5–5.7)
Lab: ABNORMAL
Lab: ABNORMAL
MICROALBUMIN/CREAT UR: NORMAL MG/G{CREAT}

## 2024-11-27 PROCEDURE — 82570 ASSAY OF URINE CREATININE: CPT | Performed by: INTERNAL MEDICINE

## 2024-11-27 PROCEDURE — 82043 UR ALBUMIN QUANTITATIVE: CPT | Performed by: INTERNAL MEDICINE

## 2024-11-27 RX ORDER — TIRZEPATIDE 10 MG/.5ML
10 INJECTION, SOLUTION SUBCUTANEOUS WEEKLY
Qty: 2 ML | Refills: 5 | Status: SHIPPED | OUTPATIENT
Start: 2024-11-27

## 2024-11-27 RX ORDER — INSULIN GLARGINE 100 [IU]/ML
INJECTION, SOLUTION SUBCUTANEOUS
Qty: 45 ML | Refills: 1 | Status: SHIPPED | OUTPATIENT
Start: 2024-11-27

## 2024-11-27 NOTE — PROGRESS NOTES
"     Office Note      Date: 2024  Patient Name: Lamar Webster  MRN: [id number]  : 1985    Chief Complaint   Patient presents with   • Diabetes       History of Present Illness:   Lamar Webster is a 39 y.o. female who presents for Diabetes type 2.   Current RX basal insulin/ mounjaro and sglt2    Bg checks are done:daily   Hypoglycemia :rare       Last A1c:  Hemoglobin A1C   Date Value Ref Range Status   2024 9.0 (A) 4.5 - 5.7 % Final   2022 8.5 (H) <5.7 % Final       Changes in health since last visit: none . Last eye exam up to date.    Subjective            Review of Systems:   Review of Systems   Constitutional:  Positive for fatigue.   Endocrine: Positive for polydipsia and polyuria.     The following portions of the patient's history were reviewed and updated as appropriate: allergies, current medications, past family history, past medical history, past social history, past surgical history, and problem list.    Objective     Visit Vitals  /74 (BP Location: Left arm, Patient Position: Sitting, Cuff Size: Adult)   Pulse 104   Ht 154.9 cm (61\")   Wt 73.9 kg (163 lb)   SpO2 98%   BMI 30.80 kg/m²           Physical Exam:  Physical Exam  Vitals reviewed.   Constitutional:       Appearance: Normal appearance. She is normal weight.   Cardiovascular:      Pulses:           Dorsalis pedis pulses are 2+ on the right side and 2+ on the left side.        Posterior tibial pulses are 2+ on the right side and 2+ on the left side.   Musculoskeletal:      Right foot: Normal range of motion. No deformity, bunion, Charcot foot, foot drop or prominent metatarsal heads.      Left foot: Normal range of motion. No deformity, bunion, Charcot foot, foot drop or prominent metatarsal heads.   Feet:      Right foot:      Protective Sensation: 10 sites tested.  10 sites sensed.      Skin integrity: Skin integrity normal.      Toenail Condition: Right toenails are normal.      Left foot:      Protective " Sensation: 10 sites tested.  10 sites sensed.      Skin integrity: Skin integrity normal.      Toenail Condition: Left toenails are normal.      Comments: Diabetic Foot Exam Performed    Neurological:      Mental Status: She is alert.        Assessment / Plan      Assessment & Plan:  Problem List Items Addressed This Visit       Uncontrolled type 2 diabetes mellitus with hyperglycemia - Primary    Overview     HAD NORMOGLYCEMIC DKA WITH JARDIANCE WHEN SHE WAS NOT ON INSULIN. HAS NOT HAD IT WHEN ON INSULIN          Current Assessment & Plan     Eyes-  checked summer 2024  Kidneys= buck  Feet- checked today.  Lipids - due next time   ====================  A1c high  Plan : increase mounjaro if tolerated  Insulin basaglar to 35 and higher if needed  Pharmacy consult           Relevant Medications    Tirzepatide (MOUNJARO) 7.5 MG/0.5ML solution pen-injector pen    Insulin Glargine (BASAGLAR KWIKPEN) 100 UNIT/ML injection pen    Jardiance 10 MG tablet tablet    Other Relevant Orders    POC Glucose, Blood (Completed)    POC Glycosylated Hemoglobin (Hb A1C) (Completed)    Microalbumin / Creatinine Urine Ratio - Urine, Clean Catch         Electronically signed by : Bharathi Powell MD  11/27/2024

## 2024-11-27 NOTE — PROGRESS NOTES
Specialty Pharmacy Patient Management Program  Endocrinology Initial Assessment     Lamar Webster was referred by an Endocrinology provider to the Endocrinology Patient Management program offered by [facility] for Type 2 Diabetes on 11/27/24.  An initial outreach was conducted, including assessment of therapy appropriateness and specialty medication education for Insulin Glargine, Jardiance, and Mounjaro. The patient was introduced to services offered by [facility], including: regular assessments, refill coordination, curbside pick-up or mail order delivery options, prior authorization maintenance, and financial assistance programs as applicable. The patient was also provided with contact information for the pharmacy team.     Insurance Coverage & Financial Support  [facility]/[employer]     Relevant Past Medical History and Comorbidities  Relevant medical history and concomitant health conditions were discussed with the patient. The patient's chart has been reviewed for relevant past medical history and comorbid conditions and updated as necessary.  Past Medical History:   Diagnosis Date    Gestational diabetes 2011    Type 2 diabetes mellitus     Type 2 diabetes mellitus, uncontrolled      Social History     Socioeconomic History    Marital status:    Tobacco Use    Smoking status: Never    Smokeless tobacco: Never   Substance and Sexual Activity    Alcohol use: Yes     Comment: Occasionally    Drug use: Never    Sexual activity: Yes     Partners: Male     Birth control/protection: Condom       Problem list reviewed by Javier Marquez RPH on 11/27/2024 at  3:23 PM    Allergies  Known allergies and reactions were discussed with the patient. The patient's chart has been reviewed for  allergy information and updated as necessary.   No Known Allergies    Allergies reviewed by Javier Marquez RPH on 11/27/2024 at  3:23 PM    Relevant Laboratory Values  Relevant  "laboratory values were discussed with the patient. The following specialty medication dose adjustment(s) are recommended: Increase mounjaro to 10mg weekly and basaglar to 35u daily MDD 50.  A1C Last 3 Results          1/29/2024    15:25 11/27/2024    14:30   HGBA1C Last 3 Results   Hemoglobin A1C 8.9  9.0      Lab Results   Component Value Date    HGBA1C 9.0 (A) 11/27/2024     Lab Results   Component Value Date    GLUCOSE 134 (H) 01/08/2022    CALCIUM 8.5 (L) 01/10/2022     01/10/2022    K 4.0 01/10/2022    CO2 21 (L) 01/10/2022     (H) 01/10/2022    BUN 3 (L) 01/10/2022    CREATININE 0.35 (L) 01/10/2022    EGFRIFAFRI >60 01/10/2022    EGFRIFNONA >60 01/10/2022    BCR 9 01/10/2022    ANIONGAP 8 01/10/2022     No results found for: \"CHOL\", \"CHLPL\", \"TRIG\", \"HDL\", \"LDL\", \"LDLDIRECT\"      Current Medication List  This medication list has been reviewed with the patient and evaluated for any interactions or necessary modifications/recommendations, and updated to include all prescription medications, OTC medications, and supplements the patient is currently taking.  This list reflects what is contained in the patient's profile, which has also been marked as reviewed to communicate to other providers it is the most up to date version of the patient's current medication therapy.     Current Outpatient Medications:     empagliflozin (Jardiance) 10 MG tablet tablet, Take 1 tablet by mouth Daily., Disp: 90 tablet, Rfl: 1    Insulin Glargine (BASAGLAR KWIKPEN) 100 UNIT/ML injection pen, ADMINISTER 35 UNITS UNDER THE SKIN EVERY NIGHT AS DIRECTED (MAX DAILY DOSE 50 UNITS), Disp: 45 mL, Rfl: 1    Biotin 5 MG capsule, Take 1 capsule by mouth Daily., Disp: , Rfl:     glucose blood (FREESTYLE LITE) test strip, TEST BLOOD SUGAR FOUR TIMES DAILY AS NEEDED, Disp: 400 each, Rfl: 1    Tirzepatide (Mounjaro) 10 MG/0.5ML solution auto-injector, Inject 10 mg under the skin into the appropriate area as directed 1 (One) Time Per " Week., Disp: 2 mL, Rfl: 5    Urine Glucose-Ketones Test strip, Use to test urine for ketones every other day, Disp: 50 each, Rfl: 5    Medicines reviewed by Javier Marquez [facility] on 11/27/2024 at  3:23 PM    Drug Interactions  No Clinically Significant DDIs Were Identified at Present Time Upon Marking Medications Reviewed    Recommended Medications Assessment  Aspirin: Not Taking Currently  Statin: Not Taking Currently  ACEi/ARB: Not Taking Currently    Initial Education Provided for Specialty Medication  The patient has been provided with the following education and any applicable administration techniques (i.e. self-injection) have been demonstrated for the therapies indicated. All questions and concerns have been addressed prior to the patient receiving the medication, and the patient has verbalized comprehension of the education and any materials provided. Additional patient education shall be provided and documented upon request by the patient, provider, or payer.    insulin glargine  Medication Expectations    Why am I taking this medication?  You are taking this medication to lower blood sugar and A1c because you have type 1 or type 2 diabetes. Diabetes is not curable but with proper medication and treatment, we can keep your blood sugar within your personalized target range.    What should I expect while on this medication?  You should expect to see your blood sugar and A1c decrease over time.    How does the medication work?  Insulin glargine (Basaglar, Lantus, Semglee, etc.) is a long-acting insulin that releases slowly and continuously in the body. Insulin helps the sugar in your blood get into cells and provide them with energy to fuel your body.     How long will I be on this medication for?  If you have Type 1 diabetes, you will be on this medication or another insulin throughout your lifetime because your body cannot make its own insulin. If you have Type 2 diabetes, the amount of time you will  be on this medication will be determined by your doctor based on blood sugar and A1c control. You will most likely be on this medication or another diabetes medication throughout your lifetime because your body does not make enough insulin. Do not abruptly stop this medication without talking to your doctor first.     How do I take this medication?  Take as directed on your prescription label. Insulin glargine is usually given once or twice daily and can be taken with or without food. Insulin glargine  is injected under the skin (subcutaneously) of your stomach, thigh, buttocks, or upper arm. Use a different injection site in the same body region each day.     What are some possible side effects?  Insulin glargine  may cause low blood sugar (hypoglycemia). Signs and symptoms that may indicate hypoglycemia include dizziness, sweating, anxiety, irritability, confusion, or headache.    What happens if I miss a dose?  If you miss a dose, take it as soon as you remember. If it is close to your next dose, skip it. Never take 2 doses at once.       Medication Safety    What are things I should warn my doctor immediately about?  Tell your doctor if you have kidney or liver problems. Talk to your doctor if you are pregnant, planning to become pregnant, or breastfeeding. Also tell your doctor if you notice any signs/symptoms of an allergic reaction (rash, hives, difficulty breathing, etc.).    What are things that I should be cautious of?  Be cautious of any side effects from this medication. Talk to your doctor if any new ones develop or aren't getting better.    What are some medications that can interact with this one?  Do not take this medication with rosiglitazone or macimorelin. Taking insulin glargine with other medications that lower your blood sugar may increase the risk of hypoglycemia. Your doctor may reduce the dose of these medications when you start insulin glargine  Always tell your doctor or pharmacist  immediately if you start taking any new medications, including over-the-counter medications, vitamins, and herbal supplements.        Medication Storage/Handling    How should I handle this medication?  Keep this medication out of reach of pets/children and keep the pen capped when not in use. Do not share your medicine with others.     How does this medication need to be stored?  Store your new, unused pens in the original carton in the refrigerator. Protect it from light. Do not freeze. Always remove the needle and place the cap on the pen before storing.     How should I dispose of this medication?  Used insulin glargine  pens should be thrown away after 28 days even if insulin remains.?Place your used pen and needle in an approved sharps container?If your doctor decides to stop this medication, take to your local police station for proper disposal. Some pharmacies also have?take-back bins for medication drop-off.??       Resources/Support    How can I remind myself to take this medication?  You can download reminder apps to help you manage your refills or set an alarm on your phone to remind you.     Is financial support available?   Manufacturers of insulin glargine  can provide co-pay cards if you have commercial insurance or patient assistance if you have Medicare or no insurance.     Which vaccines are recommended for me?  Talk to your doctor about these vaccines: Flu, Coronavirus (COVID-19), Pneumococcal (pneumonia), Tdap, Hepatitis B, Zoster (shingles)        JARDIANCE® (empagliflozin)  Medication Expectations   Why am I taking this medication? You could be taking this medication for several reasons:  To lower blood sugar because you have type 2 diabetes  To reduce your risk of death from heart attack or stroke if you have heart disease and type 2 diabetes  To reduce your risk of death or hospitalization for heart failure  To reduce your risk of further kidney damage, death, or hospitalization if you have  chronic kidney disease   What should I expect while on this medication? You should expect to see your blood sugar and A1c decrease over time if you have diabetes. You may also see a decrease in your blood pressure and it can help some people lose weight.     How does the medication work? Jardiance works by helping to remove some sugar that the body doesn't need through urination.    How long will I be on this medication for? The amount of time you will be on this medication will be determined by your doctor based on blood sugar and A1c control. You will most likely be on this medication or another diabetes medication throughout your lifetime. Do not abruptly stop this medication without talking to your doctor first.    How do I take this medication? Take as directed on your prescription label. This medication is usually taken in the morning and can be given with or without food.    What are some possible side effects? You may notice increased urination, especially when you first start Jardiance. The most common side effects are urinary tract infections and yeast infections and are more commonly seen in females. Talk with your doctor if you notice white or yellow vaginal discharge, vaginal itching or odor of if you notice redness, itching, pain, or swelling of the penis and/or bad-smelling discharge from the penis.    What happens if I miss a dose? If you miss a dose, take it as soon as you remember. If it is close to your next dose, skip it (do not take 2 doses at once)     Medication Safety   What are things I should warn my doctor immediately about? Tell your doctor if you have kidney disease, liver disease, heart failure, pancreas problems, or history of frequent genital yeast or urinary tract infections. Tell your doctor if you are on a low-salt diet, if you drink alcohol, or if you are having surgery. Talk to your doctor if you are pregnant, planning to become pregnant, or breastfeeding. Also tell your doctor  if you notice any signs/symptoms of an allergic reaction (rash, hives, difficulty breathing, etc.).   What are things that I should be cautious of? Be cautious of any side effects from this medication. Talk to your doctor if any new ones develop or aren't getting better.   What are some medications that can interact with this one? Some medications that interact include diuretics (water pills) and other medications that may also lower your blood sugar such as insulins and glipizide/glimepiride/glyburide. Your doctor may reduce the dose of these medications when you start Jardiance to minimize low blood sugars. Always tell your doctor or pharmacist immediately if you start taking any new medications, including over-the-counter medications, vitamins, and herbal supplements.      Medication Storage/Handling   How should I handle this medication? Keep this medication out of reach of pets/children in tightly sealed container   How does this medication need to be stored? Store at room temperature and keep dry (don't keep in bathroom or other room with moisture)   How should I dispose of this medication? There should not be a need to dispose of this medication unless your provider decides to change the dose or therapy. If that is the case, take to your local police station for proper disposal. Some pharmacies also have take-back bins for medication drop-off.      Resources/Support   How can I remind myself to take this medication? You can download reminder apps to help you manage your refills. You may also set an alarm on your phone to remind you. The pharmacy carries pill boxes that you can place next to an area you pass everyday (such as where you place your car keys or where you charge your phone)   Is financial support available?  [employer] can provide co-pay cards if you have commercial insurance or patient assistance if you have Medicare or no insurance.    Which vaccines are recommended for me? Talk  to your doctor about these vaccines: Flu, Coronavirus (COVID-19), Pneumococcal (pneumonia), Tdap, Hepatitis B, Zoster (shingles)      Mounjaro® (tirzepatide)   How long will I be on this medication for?  The amount of time you will be on this medication will be determined by your doctor based on blood sugar and A1c control. You will most likely be on this medication or another diabetes medication throughout your lifetime. Do not abruptly stop this medication without talking to your doctor first.     How do I take this medication?  Take as directed on your prescription label. Mounjaro is supplied in a single-use pen for each dose and you will use a new pre-filled pen each week.  It is injected under the skin (subcutaneously) of your stomach, thigh or upper arm.  You may inject in the same body area each week, on the same day each week, with or without food.      What are some possible side effects?  In addition to decreased appetite, the most common side effects are nausea and indigestion. Redness, itching, and/or swelling at the injection site may occur. You should also monitor for low blood sugar (hypoglycemia) if you are taking Mounjaro with other medications that cause low blood sugar.     What happens if I miss a dose?  If you miss a dose, take it as soon as you remember as long as there are at least 3 days until the next scheduled dose.  If there are less than 3 days, skip the missed dose and resume  Mounjaro on the regularly scheduled day.  Do not take 2 doses at the same time or extra doses.      Medication Safety    What are things I should warn my doctor immediately about?  Do not use Mounjaro if you or a family member have ever had medullary thyroid cancer (MTC) or Multiple Endocrine Neoplasia syndrome type 2 (MEN 2).  Tell your doctor if you have or have had problems with your kidneys or pancreas.  Talk to your doctor if you are pregnant, planning to become pregnant, or breastfeeding. Stop using Mounjaro  and get medical help right away if you have severe pain in your stomach area that will not go away, or if you notice any signs/symptoms of an allergic reaction (rash, hives, difficulty breathing, etc.).    What are things that I should be cautious of?  Be cautious of any side effects from this medication. Talk to your doctor if any new ones develop or aren't getting better.    What are some medications that can interact with this one?  Taking Mounjaro with other medications that also lower your blood sugar such as insulin and glipizide/glimepiride/glyburide may increase the risk of low blood sugar. Your doctor may reduce the dose of these medications when you start Mounjaro to minimize low blood sugars.  Always tell your doctor or pharmacist immediately if you start taking any new medications, including over-the-counter medications, vitamins, and herbal supplements.        Medication Storage/Handling    How should I handle this medication?  Keep this medication out of reach of pets/children and keep the pen capped when not in use.  Do not share your medicine pens with others.    How does this medication need to be stored?  Store Mounjaro in the refrigerator. Do not freeze and do not use if Mounjaro has been frozen.  You may store your Mounjaro pens at room temperature for up to 21 days.  Protect from excessive heat and sunlight.  Keep in the original carton until time of administration.    How should I dispose of this medication?  Used Mounjaro pens should discarded after each use in an approved sharps container after use.  If you do not have a sharps container, you may use a household container made of heavy-duty plastic with a tight-fitting lid that is leak resistant (e.g., heavy-duty plastic laundry detergent bottle). If your doctor decides to stop this medication, take to your local police station for proper disposal. Some pharmacies also have take-back bins for medication drop-off.       Resources/Support     How can I remind myself to take this medication?  You can download reminder apps to help you manage your refills. You may also set an alarm on your phone to remind you.     Is financial support available?   [employer] can provide co-pay cards if you have commercial insurance or patient assistance if you have Medicare or no insurance.     Which vaccines are recommended for me?  Talk to your doctor about these vaccines: Flu, Coronavirus (COVID-19), Pneumococcal (pneumonia), Tdap, Hepatitis B, Zoster (shingles)       Adherence and Self-Administration  Adherence related to the patient's specialty therapy was discussed with the patient. The Adherence segment of this outreach has been reviewed and updated.     Is there a concern with patient's ability to self administer the medication correctly and without issue?: No  Were any potential barriers to adherence identified during the initial assessment or patient education?: No  Are there any concerns regarding the patient's understanding of the importance of medication adherence?: No  Methods for Supporting Patient Adherence and/or Self-Administration: Today I spoke to the patient in person during her office visit. We discussed the medication profile for each medication including proper injection technique using mounjaro and basaglar.    Open Medication Therapy Problems  No medication therapy recommendations to display    Goals of Therapy  Goals related to the patient's specialty therapy were discussed with the patient. The Patient Goals segment of this outreach has been reviewed and updated.   Goals Addressed Today        Specialty Pharmacy General Goal      A1C < 7 %     Lab Results    Component Value Date Notes    HGBA1C 9.0 (A) 11/27/2024 New enrollment. A1C not at goal. Patient states she can not get mounjaro consistently causing her to miss doses. We will help manage her supply issues. Increasing Mounjaro to 10mg weekly and basaglar to 35 units daily MDD 50 units/day.  NB    HGBA1C 8.9 (A) 01/29/2024                  Reassessment Plan & Follow-Up  1. Medication Therapy Changes: Increase Mounjaro to 10mg weekly and Basaglar to 35 untis daily MDD 50.  2. Related Plans, Therapy Recommendations, or Therapy Problems to Be Addressed: Follow A1C during office visits.  3. Pharmacist to perform regular assessments no more than (6) months from the previous assessment.  4. Care Coordinator to set up future refill outreaches, coordinate prescription delivery, and escalate clinical questions to pharmacist.  5. Welcome information and patient satisfaction survey to be sent by specialty pharmacy team with patient's initial fill.    Attestation  Therapeutic appropriateness: Appropriate   I attest the patient was actively involved in and has agreed to the above plan of care. If the prescribed therapy is at any point deemed not appropriate based on the current or future assessments, a consultation will be initiated with the patient's specialty care provider to determine the best course of action. The revised plan of therapy will be documented along with any required assessments and/or additional patient education provided.     Jeffrey Marquez, PharmD  Clinical Specialty Pharmacist, Endocrinology  11/27/2024  15:41 EST    Discussed the aforementioned information with the patient via In-Person.

## 2024-11-27 NOTE — PROGRESS NOTES
Specialty Pharmacy Patient Management Program  Per Protocol Prescription Order/Refill     Patient currently fills medications at [facility] and is enrolled in an Endocrinology Patient Management Program.     Requested Prescriptions     Pending Prescriptions Disp Refills    Tirzepatide (Mounjaro) 10 MG/0.5ML solution auto-injector 2 mL 5     Sig: Inject 10 mg under the skin into the appropriate area as directed 1 (One) Time Per Week.    empagliflozin (Jardiance) 10 MG tablet tablet 90 tablet 1     Sig: Take 1 tablet by mouth Daily.    Insulin Glargine (BASAGLAR KWIKPEN) 100 UNIT/ML injection pen 45 mL 1     Sig: ADMINISTER 35 UNITS UNDER THE SKIN EVERY NIGHT AS DIRECTED (MAX DAILY DOSE 50 UNITS)     Prescription orders above were sent to the pharmacy per Collaborative Care Agreement Protocol.     Jeffrey Marquez, PharmD  Clinical Specialty Pharmacist, Endocrinology  11/27/2024  15:15 EST

## 2024-11-27 NOTE — ASSESSMENT & PLAN NOTE
Eyes-  checked summer 2024  Kidneys= buck  Feet- checked today.  Lipids - due next time   ====================  A1c high  Plan : increase mounjaro if tolerated  Insulin basaglar to 35 and higher if needed  Pharmacy consult

## 2024-11-27 NOTE — PROGRESS NOTES
Specialty Pharmacy Patient Management Program  Endocrinology Initial Fill Outreach      Lamar is a 39 y.o. female contacted today regarding initial fill of her medication(s).    Specialty medication(s) and dose(s) confirmed: Mounjaro 10mg weekly, Basaglar MDD 50 daily    Delivery Questions      Flowsheet Row Most Recent Value   Delivery method UPS   Delivery address verified with patient/caregiver? Yes   Delivery address Home   Number of medications in delivery 3   Medication(s) being filled and delivered Tirzepatide (Mounjaro), Empagliflozin (JARDIANCE), Insulin Glargine   Doses left of specialty medications 0   Copay verified? Yes   Copay amount $90   Copay form of payment Credit/debit on file   Ship Date 12/2/24   Delivery Date 12/3/24   Signature Required No            Follow-Up: 28 days  Jeffrey Marquez, RossanaD  Clinical Specialty Pharmacist, Endocrinology  11/27/2024  15:40 EST

## 2024-12-09 ENCOUNTER — SPECIALTY PHARMACY (OUTPATIENT)
Age: 39
End: 2024-12-09
Payer: COMMERCIAL

## 2024-12-09 NOTE — PROGRESS NOTES
Specialty Pharmacy Patient Management Program  Endocrinology Initial Fill Outreach      Lamar is a 39 y.o. female contacted today regarding initial fill of her medication(s).    Specialty medication(s) and dose(s) confirmed: Jardiance.    Delivery Questions      Flowsheet Row Most Recent Value   Delivery method UPS   Delivery address verified with patient/caregiver? Yes   Delivery address Home   Number of medications in delivery 1   Medication(s) being filled and delivered Empagliflozin (JARDIANCE)   Doses left of specialty medications initial Fill   Copay verified? Yes   Copay amount $10   Copay form of payment Credit/debit on file   Ship Date 12/9   Delivery Date 12/10   Signature Required No            Follow-Up: kenn Iyer CPhT  Pharmacy Care Coordinator, Endocrinology  12/9/2024  11:45 EST

## 2024-12-23 ENCOUNTER — SPECIALTY PHARMACY (OUTPATIENT)
Dept: GENERAL RADIOLOGY | Facility: HOSPITAL | Age: 39
End: 2024-12-23
Payer: COMMERCIAL

## 2024-12-23 RX ORDER — PEN NEEDLE, DIABETIC 32GX 5/32"
1 NEEDLE, DISPOSABLE MISCELLANEOUS DAILY
Qty: 100 EACH | Refills: 1 | Status: SHIPPED | OUTPATIENT
Start: 2024-12-23

## 2024-12-23 NOTE — PROGRESS NOTES
Specialty Pharmacy Patient Management Program  Per Protocol Prescription Order/Refill     Patient currently fills medications at [facility] and is enrolled in an Endocrinology Patient Management Program.     Requested Prescriptions      No prescriptions requested or ordered in this encounter     Prescription orders above were sent to the pharmacy per Collaborative Care Agreement Protocol.     Jeffrey Maruqez, PharmD  Clinical Specialty Pharmacist, Endocrinology  12/23/2024  10:40 EST

## 2024-12-23 NOTE — PROGRESS NOTES
Specialty Pharmacy Patient Management Program  Per Protocol Prescription Order/Refill     Patient currently fills medications at [facility] and is enrolled in an Endocrinology Patient Management Program.     Requested Prescriptions     Pending Prescriptions Disp Refills    Insulin Pen Needle (BD Pen Needle Shonna 2nd Gen) 32G X 4 MM misc 100 each 1     Sig: Use 1 Needle Daily.     Prescription orders above were sent to the pharmacy per Collaborative Care Agreement Protocol.     Jeffrey Marquez, PharmD  Clinical Specialty Pharmacist, Endocrinology  12/23/2024  10:36 EST

## 2024-12-23 NOTE — PROGRESS NOTES
Specialty Pharmacy Patient Management Program  Endocrinology Refill Outreach      Lamar is a 39 y.o. female contacted today regarding refills of her medication(s).    Specialty medication(s) and dose(s) confirmed: Mounjaro 10mg weekly    Refill Questions      Flowsheet Row Most Recent Value   Changes to allergies? No   Changes to medications? No   New conditions or infections since last clinic visit No   Unplanned office visit, urgent care, ED, or hospital admission in the last 4 weeks  No   How does patient/caregiver feel medication is working? Excellent   Financial problems or insurance changes  No   Since the previous refill, were any specialty medication doses or scheduled injections missed or delayed?  No   Does this patient require a clinical escalation to a pharmacist? No          Delivery Questions      Flowsheet Row Most Recent Value   Delivery method UPS   Delivery address verified with patient/caregiver? Yes   Delivery address Home   Number of medications in delivery 1   Medication(s) being filled and delivered Tirzepatide (Mounjaro)   Doses left of specialty medications 1   Copay verified? Yes   Copay amount $25   Copay form of payment Credit/debit on file   Ship Date 12/23/24   Delivery Date 12/24/24   Signature Required No            Follow-Up: 28 days  Jeffrey Marquez, RossanaD  Clinical Specialty Pharmacist, Endocrinology  12/23/2024  10:41 EST

## 2025-01-22 ENCOUNTER — SPECIALTY PHARMACY (OUTPATIENT)
Age: 40
End: 2025-01-22
Payer: COMMERCIAL

## 2025-01-22 NOTE — PROGRESS NOTES
Specialty Pharmacy Patient Management Program  Endocrinology Refill Outreach      Lamar is a 39 y.o. female contacted today regarding refills of her medication(s).    Specialty medication(s) and dose(s) confirmed: Mounjaro.    Refill Questions      Flowsheet Row Most Recent Value   Changes to allergies? No   Changes to medications? No   New conditions or infections since last clinic visit No   Unplanned office visit, urgent care, ED, or hospital admission in the last 4 weeks  No   How does patient/caregiver feel medication is working? Very good   Financial problems or insurance changes  No   Since the previous refill, were any specialty medication doses or scheduled injections missed or delayed?  No   Does this patient require a clinical escalation to a pharmacist? No          Delivery Questions      Flowsheet Row Most Recent Value   Delivery method UPS   Delivery address verified with patient/caregiver? Yes   Delivery address Home   Number of medications in delivery 1   Medication(s) being filled and delivered Tirzepatide (Mounjaro)   Copay verified? Yes   Copay amount $25   Copay form of payment Credit/debit on file   Ship Date 1/23   Delivery Date Selection 01/24/25   Signature Required No            Follow-Up: 28d    Tristen Iyer CPhT  Pharmacy Care Coordinator, Endocrinology  1/22/2025  13:04 EST

## 2025-01-29 ENCOUNTER — PRIOR AUTHORIZATION (OUTPATIENT)
Dept: ENDOCRINOLOGY | Facility: CLINIC | Age: 40
End: 2025-01-29
Payer: COMMERCIAL

## 2025-01-29 NOTE — TELEPHONE ENCOUNTER
AVILA MCDONALD (Key: [id number])  PA Case ID #: [id number]  Need Help? Call us at [phone]  Outcome  [facility]  Your PA request has been approved. Additional information will be provided in the approval communication. (Message 1149)  Authorization Expiration Date: 1/29/2026  Drug  Jardiance 10MG tablets  ePA cloud logo  Form  Pro Electronic PA Form (2017 NCPDP)

## 2025-02-18 ENCOUNTER — SPECIALTY PHARMACY (OUTPATIENT)
Age: 40
End: 2025-02-18
Payer: COMMERCIAL

## 2025-02-18 NOTE — PROGRESS NOTES
Specialty Pharmacy Patient Management Program  Endocrinology Refill Outreach      Lamar is a 39 y.o. female contacted today regarding refills of her medication(s).    Specialty medication(s) and dose(s) confirmed: Mounjaro.    Refill Questions      Flowsheet Row Most Recent Value   Changes to allergies? No   Changes to medications? No   New conditions or infections since last clinic visit No   Unplanned office visit, urgent care, ED, or hospital admission in the last 4 weeks  No   How does patient/caregiver feel medication is working? Very good   Financial problems or insurance changes  No   Since the previous refill, were any specialty medication doses or scheduled injections missed or delayed?  No   Does this patient require a clinical escalation to a pharmacist? No          Delivery Questions      Flowsheet Row Most Recent Value   Delivery method UPS   Delivery address verified with patient/caregiver? Yes   Delivery address Home   Number of medications in delivery 1   Medication(s) being filled and delivered Tirzepatide (Mounjaro)   Doses left of specialty medications 1   Copay verified? Yes   Copay amount $25   Copay form of payment Credit/debit on file   Delivery Date Selection 02/21/25   Signature Required No            Follow-Up: 28d    Tristen Iyer CPhT  Pharmacy Care Coordinator, Endocrinology  2/18/2025  12:19 EST

## 2025-03-03 ENCOUNTER — SPECIALTY PHARMACY (OUTPATIENT)
Age: 40
End: 2025-03-03
Payer: COMMERCIAL

## 2025-03-03 NOTE — PROGRESS NOTES
Specialty Pharmacy Patient Management Program  Endocrinology Refill Outreach      Lamar is a 39 y.o. female contacted today regarding refills of her medication(s).    Specialty medication(s) and dose(s) confirmed: Basaglar, Jardiance.    Refill Questions      Flowsheet Row Most Recent Value   Changes to allergies? No   Changes to medications? No   New conditions or infections since last clinic visit No   Unplanned office visit, urgent care, ED, or hospital admission in the last 4 weeks  No   How does patient/caregiver feel medication is working? Very good   Financial problems or insurance changes  No   Since the previous refill, were any specialty medication doses or scheduled injections missed or delayed?  No   Does this patient require a clinical escalation to a pharmacist? No          Delivery Questions      Flowsheet Row Most Recent Value   Delivery method UPS   Delivery address verified with patient/caregiver? Yes   Delivery address Home   Number of medications in delivery 2   Medication(s) being filled and delivered Empagliflozin (JARDIANCE), Insulin Glargine   Doses left of specialty medications 1   Copay verified? Yes   Copay amount $75   Copay form of payment Credit/debit on file   Delivery Date Selection 03/04/25   Signature Required No            Follow-Up: kenn Iyer CPhT  Pharmacy Care Coordinator, Endocrinology  3/3/2025  12:44 EST

## 2025-03-14 ENCOUNTER — SPECIALTY PHARMACY (OUTPATIENT)
Age: 40
End: 2025-03-14
Payer: COMMERCIAL

## 2025-03-14 NOTE — PROGRESS NOTES
Specialty Pharmacy Patient Management Program  Endocrinology Refill Outreach      Lamar is a 39 y.o. female contacted today regarding refills of her medication(s).    Specialty medication(s) and dose(s) confirmed: Mounjaro.    Refill Questions      Flowsheet Row Most Recent Value   Changes to allergies? No   Changes to medications? No   New conditions or infections since last clinic visit No   Unplanned office visit, urgent care, ED, or hospital admission in the last 4 weeks  No   How does patient/caregiver feel medication is working? Very good   Financial problems or insurance changes  No   Since the previous refill, were any specialty medication doses or scheduled injections missed or delayed?  No   Does this patient require a clinical escalation to a pharmacist? No          Delivery Questions      Flowsheet Row Most Recent Value   Delivery method UPS   Delivery address verified with patient/caregiver? Yes   Delivery address Home   Number of medications in delivery 1   Medication(s) being filled and delivered Tirzepatide (Mounjaro)   Doses left of specialty medications 1   Copay verified? Yes   Copay amount $25   Copay form of payment Credit/debit on file   Delivery Date Selection 03/18/25   Signature Required No            Follow-Up: 28d    Tristen Iyer CPhT  Pharmacy Care Coordinator, Endocrinology  3/14/2025  09:38 EDT

## 2025-04-07 ENCOUNTER — SPECIALTY PHARMACY (OUTPATIENT)
Dept: GENERAL RADIOLOGY | Facility: HOSPITAL | Age: 40
End: 2025-04-07
Payer: COMMERCIAL

## 2025-04-07 NOTE — PROGRESS NOTES
Specialty Pharmacy Patient Management Program  Endocrinology Reassessment     Lamar Webster was referred by an Endocrinology provider to the Endocrinology Patient Management program offered by [facility] for Type 2 Diabetes. A follow-up outreach was conducted, including assessment of continued therapy appropriateness, medication adherence, and side effect incidence and management for Insulin Glargine, Jardiance, and Mounjaro.    Changes to Insurance Coverage or Financial Support  No changes    Relevant Past Medical History and Comorbidities  Relevant medical history and concomitant health conditions were discussed with the patient. The patient's chart has been reviewed for relevant past medical history and comorbid health conditions and updated as necessary.   Past Medical History:   Diagnosis Date    Gestational diabetes 2011    Type 2 diabetes mellitus     Type 2 diabetes mellitus, uncontrolled      Social History     Socioeconomic History    Marital status:    Tobacco Use    Smoking status: Never    Smokeless tobacco: Never   Substance and Sexual Activity    Alcohol use: Yes     Comment: Occasionally    Drug use: Never    Sexual activity: Yes     Partners: Male     Birth control/protection: Condom     Problem list reviewed by Javier Marquez RPH on 4/7/2025 at 10:20 AM    Hospitalizations and Urgent Care Since Last Assessment  ED Visits, Admissions, or Hospitalizations: none reported  Urgent Office Visits: none reported    Allergies  Known allergies and reactions were discussed with the patient. The patient's chart has been reviewed for allergy information and updated as necessary.   No Known Allergies  Allergies reviewed by Javier Marquez RPH on 4/7/2025 at 10:20 AM    Relevant Laboratory Values  Relevant laboratory values were discussed with the patient. The following specialty medication dose adjustment(s) are recommended: no changes  A1C Last 3 Results           "11/27/2024    14:30   HGBA1C Last 3 Results   Hemoglobin A1C 9.0      Lab Results   Component Value Date    HGBA1C 9.0 (A) 11/27/2024     Lab Results   Component Value Date    GLUCOSE 134 (H) 01/08/2022    CALCIUM 8.5 (L) 01/10/2022     01/10/2022    K 4.0 01/10/2022    CO2 21 (L) 01/10/2022     (H) 01/10/2022    BUN 3 (L) 01/10/2022    CREATININE 0.35 (L) 01/10/2022    EGFRIFAFRI >60 01/10/2022    EGFRIFNONA >60 01/10/2022    BCR 9 01/10/2022    ANIONGAP 8 01/10/2022     No results found for: \"CHOL\", \"CHLPL\", \"TRIG\", \"HDL\", \"LDL\", \"LDLDIRECT\"  Microalbumin          11/27/2024    14:47   Microalbumin   Microalbumin, Urine <1.2      Current Medication List  This medication list has been reviewed with the patient and evaluated for any interactions or necessary modifications/recommendations, and updated to include all prescription medications, OTC medications, and supplements the patient is currently taking.  This list reflects what is contained in the patient's profile, which has also been marked as reviewed to communicate to other providers it is the most up to date version of the patient's current medication therapy.     Current Outpatient Medications:     Biotin 5 MG capsule, Take 1 capsule by mouth Daily., Disp: , Rfl:     empagliflozin (Jardiance) 10 MG tablet tablet, Take 1 tablet by mouth Daily., Disp: 90 tablet, Rfl: 1    glucose blood (FREESTYLE LITE) test strip, TEST BLOOD SUGAR FOUR TIMES DAILY AS NEEDED, Disp: 400 each, Rfl: 1    Insulin Glargine (BASAGLAR KWIKPEN) 100 UNIT/ML injection pen, ADMINISTER 35 UNITS UNDER THE SKIN EVERY NIGHT AS DIRECTED (MAX DAILY DOSE 50 UNITS), Disp: 45 mL, Rfl: 1    Insulin Pen Needle (BD Pen Needle Shonna 2nd Gen) 32G X 4 MM misc, Use 1 Needle Daily., Disp: 100 each, Rfl: 1    Tirzepatide (Mounjaro) 10 MG/0.5ML solution auto-injector, Inject 10 mg under the skin into the appropriate area as directed 1 (One) Time Per Week., Disp: 2 mL, Rfl: 5    Urine " Glucose-Ketones Test strip, Use to test urine for ketones every other day, Disp: 50 each, Rfl: 5    Medicines reviewed by Javier Marquez [facility] on 4/7/2025 at 10:20 AM    Drug Interactions  No Clinically Significant DDIs Were Identified at Present Time Upon Marking Medications Reviewed      Recommended Medications Assessment  Aspirin: Not Taking Currently  Statin: Not Taking Currently  ACEi/ARB: Not Taking Currently    Adverse Drug Reactions  Medication tolerability: Tolerating with no to minimal ADRs  Medication plan: Continue therapy with normal follow-up  Plan for ADR Management: n/a    Adherence, Self-Administration, and Current Therapy Problems  Adherence related to the patient's specialty therapy was discussed with the patient. The Adherence segment of this outreach has been reviewed and updated.     Adherence Questions  Linked Medication(s) Assessed: Empagliflozin (JARDIANCE), Insulin Glargine, Tirzepatide (Mounjaro)  On average, how many doses/injections does the patient miss per month?: 0  What are the identified reasons for non-adherence or missed doses? : no problems identified  What is the estimated medication adherence level?: (S) % (Mounjaro PDC falsely low (81%)had trouble in the past finding it in stock at her local pharmacy caused her to miss doses in the past but her last 5 first with our program have been filled on-time and pt reports no missed doses. Basaglar not taking MDD)  Based on the patient/caregiver response and refill history, does this patient require an MTP to track adherence improvements?: no    Additional Barriers to Patient Self-Administration: none identified  Methods for Supporting Patient Self-Administration: n/a    Open Medication Therapy Problems  No medication therapy recommendations to display    Goals of Therapy  Goals related to the patient's specialty therapy were discussed with the patient. The Patient Goals segment of this outreach has been reviewed and  updated.   Goals Addressed Today        Specialty Pharmacy General Goal      A1C < 7 %     Lab Results    Component Value Date Notes      04/07/2025 Phone reassessment. No new lab values. Patient states she is tolerating her medication well and reports no missed doses. Will check A1C during next office visit 8/6/2025. NB    HGBA1C 9.0 (A) 11/27/2024 New enrollment. A1C not at goal. Patient states she can not get mounjaro consistently causing her to miss doses. We will help manage her supply issues. Increasing Mounjaro to 10mg weekly and basaglar to 35 units daily MDD 50 units/day. NB    HGBA1C 8.9 (A) 01/29/2024                    Quality of Life Assessment   Quality of Life related to the patient's enrollment in the patient management program and services provided was discussed with the patient. The QOL segment of this outreach has been reviewed and updated.  Quality of Life Improvement Scale: 9-A good deal better    Reassessment Plan & Follow-Up  1. Medication Therapy Changes: no changes  2. Related Plans, Therapy Recommendations, or Issues to Be Addressed: check A1c during next office visit on 8/6/2025  3. Pharmacist to perform regular assessments no more than (6) months from the previous assessment.  4. Care Coordinator to set up future refill outreaches, coordinate prescription delivery, and escalate clinical questions to pharmacist.    Attestation  Therapeutic appropriateness: Appropriate   I attest the patient was actively involved in and has agreed to the above plan of care.  If the prescribed therapy is at any point deemed not appropriate based on the current or future assessments, a consultation will be initiated with the patient's specialty care provider to determine the best course of action. The revised plan of therapy will be documented along with any required assessments and/or additional patient education provided.     Jeffrey Marquez, PharmD  Clinical Specialty Pharmacist,  Endocrinology  4/7/2025  10:24 EDT    Discussed the aforementioned information with the patient via Telephone.

## 2025-04-07 NOTE — PROGRESS NOTES
Specialty Pharmacy Patient Management Program  Endocrinology Refill Outreach      Lamar is a 39 y.o. female contacted today regarding refills of her medication(s).    Specialty medication(s) and dose(s) confirmed: mounjaro 10mg weekly    Refill Questions      Flowsheet Row Most Recent Value   Changes to allergies? No   Changes to medications? No   New conditions or infections since last clinic visit No   Unplanned office visit, urgent care, ED, or hospital admission in the last 4 weeks  No   How does patient/caregiver feel medication is working? Excellent   Financial problems or insurance changes  No   Since the previous refill, were any specialty medication doses or scheduled injections missed or delayed?  No   Does this patient require a clinical escalation to a pharmacist? No          Delivery Questions      Flowsheet Row Most Recent Value   Delivery method UPS   Delivery address verified with patient/caregiver? Yes   Delivery address Home   Number of medications in delivery 1   Medication(s) being filled and delivered Tirzepatide (Mounjaro)   Doses left of specialty medications 1   Copay verified? Yes   Copay amount $25   Copay form of payment Credit/debit on file   Delivery Date Selection 04/08/25   Signature Required No            Follow-Up: 28 days  Jeffrey Marquez, RossanaD  Clinical Specialty Pharmacist, Endocrinology  4/7/2025  10:24 EDT

## 2025-04-30 ENCOUNTER — SPECIALTY PHARMACY (OUTPATIENT)
Dept: GENERAL RADIOLOGY | Facility: HOSPITAL | Age: 40
End: 2025-04-30
Payer: COMMERCIAL

## 2025-04-30 ENCOUNTER — SPECIALTY PHARMACY (OUTPATIENT)
Age: 40
End: 2025-04-30
Payer: COMMERCIAL

## 2025-04-30 RX ORDER — TIRZEPATIDE 10 MG/.5ML
10 INJECTION, SOLUTION SUBCUTANEOUS WEEKLY
Qty: 6 ML | Refills: 1 | Status: SHIPPED | OUTPATIENT
Start: 2025-04-30

## 2025-04-30 NOTE — PROGRESS NOTES
Specialty Pharmacy Patient Management Program  Per Protocol Prescription Order/Refill     Patient currently fills medications at [facility] and is enrolled in an Endocrinology Patient Management Program.     Requested Prescriptions     Pending Prescriptions Disp Refills    Tirzepatide (Mounjaro) 10 MG/0.5ML solution auto-injector 6 mL 1     Sig: Inject 10 mg under the skin into the appropriate area as directed 1 (One) Time Per Week.     Prescription orders above were sent to the pharmacy per Collaborative Care Agreement Protocol.     Jeffrey Marquez, PharmD  Clinical Specialty Pharmacist, Endocrinology  4/30/2025  13:18 EDT

## 2025-04-30 NOTE — PROGRESS NOTES
Specialty Pharmacy Patient Management Program  Endocrinology Refill Outreach      Lamar is a 39 y.o. female contacted today regarding refills of her medication(s).    Specialty medication(s) and dose(s) confirmed: Mounjaro.    Refill Questions      Flowsheet Row Most Recent Value   Changes to allergies? No   Changes to medications? No   New conditions or infections since last clinic visit No   Unplanned office visit, urgent care, ED, or hospital admission in the last 4 weeks  No   How does patient/caregiver feel medication is working? Very good   Financial problems or insurance changes  No   Since the previous refill, were any specialty medication doses or scheduled injections missed or delayed?  No   Does this patient require a clinical escalation to a pharmacist? No          Delivery Questions      Flowsheet Row Most Recent Value   Delivery method UPS   Delivery address verified with patient/caregiver? Yes   Delivery address Home   Number of medications in delivery 1   Medication(s) being filled and delivered Tirzepatide (Mounjaro)   Doses left of specialty medications 1   Copay verified? Yes   Copay amount $25   Copay form of payment Credit/debit on file   Delivery Date Selection 05/02/25   Signature Required No   Do you consent to receive electronic handouts?  No            Follow-Up: 28d    Tristen Iyer CPhT  Pharmacy Care Coordinator, Endocrinology  4/30/2025  13:50 EDT

## 2025-05-21 ENCOUNTER — SPECIALTY PHARMACY (OUTPATIENT)
Age: 40
End: 2025-05-21
Payer: COMMERCIAL

## 2025-05-21 NOTE — PROGRESS NOTES
Specialty Pharmacy Patient Management Program  Endocrinology Refill Outreach      Lamar is a 39 y.o. female contacted today regarding refills of her medication(s).    Specialty medication(s) and dose(s) confirmed: Mounjaro.    Refill Questions      Flowsheet Row Most Recent Value   Changes to allergies? No   Changes to medications? No   New conditions or infections since last clinic visit No   Unplanned office visit, urgent care, ED, or hospital admission in the last 4 weeks  No   How does patient/caregiver feel medication is working? Very good   Financial problems or insurance changes  No   Since the previous refill, were any specialty medication doses or scheduled injections missed or delayed?  No   Does this patient require a clinical escalation to a pharmacist? No          Delivery Questions      Flowsheet Row Most Recent Value   Delivery method UPS   Delivery address verified with patient/caregiver? Yes   Delivery address Home   Number of medications in delivery 1   Medication(s) being filled and delivered Tirzepatide (Mounjaro)   Doses left of specialty medications 1   Copay verified? Yes   Copay amount $25   Copay form of payment Credit/debit on file   Delivery Date Selection 05/22/25   Signature Required No            Follow-Up: 28d    Tristen Iyer CPhT  Pharmacy Care Coordinator, Endocrinology  5/21/2025  10:25 EDT

## 2025-05-29 ENCOUNTER — SPECIALTY PHARMACY (OUTPATIENT)
Age: 40
End: 2025-05-29
Payer: COMMERCIAL

## 2025-05-29 ENCOUNTER — SPECIALTY PHARMACY (OUTPATIENT)
Dept: GENERAL RADIOLOGY | Facility: HOSPITAL | Age: 40
End: 2025-05-29
Payer: COMMERCIAL

## 2025-05-29 RX ORDER — INSULIN GLARGINE 100 [IU]/ML
INJECTION, SOLUTION SUBCUTANEOUS
Qty: 45 ML | Refills: 1 | Status: SHIPPED | OUTPATIENT
Start: 2025-05-29

## 2025-05-29 NOTE — PROGRESS NOTES
Specialty Pharmacy Patient Management Program  Per Protocol Prescription Order/Refill     Patient currently fills medications at [facility] and is enrolled in an Endocrinology Patient Management Program.     Requested Prescriptions     Pending Prescriptions Disp Refills    empagliflozin (Jardiance) 10 MG tablet tablet 90 tablet 1     Sig: Take 1 tablet by mouth Daily.    Insulin Glargine (BASAGLAR KWIKPEN) 100 UNIT/ML injection pen 45 mL 1     Sig: ADMINISTER 35 UNITS UNDER THE SKIN EVERY NIGHT AS DIRECTED (MAX DAILY DOSE 50 UNITS)     Prescription orders above were sent to the pharmacy per Collaborative Care Agreement Protocol.     Jeffrey Marquez, PharmD  Clinical Specialty Pharmacist, Endocrinology  5/29/2025  11:23 EDT

## 2025-05-29 NOTE — PROGRESS NOTES
Specialty Pharmacy Patient Management Program  Endocrinology Refill Outreach      Lamar is a 39 y.o. female contacted today regarding refills of her medication(s).    Specialty medication(s) and dose(s) confirmed: Basaglar, Jardiance.    Refill Questions      Flowsheet Row Most Recent Value   Changes to allergies? No   Changes to medications? No   New conditions or infections since last clinic visit No   Unplanned office visit, urgent care, ED, or hospital admission in the last 4 weeks  No   How does patient/caregiver feel medication is working? Very good   Financial problems or insurance changes  No   Since the previous refill, were any specialty medication doses or scheduled injections missed or delayed?  No   Does this patient require a clinical escalation to a pharmacist? No          Delivery Questions      Flowsheet Row Most Recent Value   Delivery method UPS   Delivery address verified with patient/caregiver? Yes   Delivery address Home   Number of medications in delivery 2   Medication(s) being filled and delivered Empagliflozin (JARDIANCE), Insulin Glargine   Doses left of specialty medications 1   Copay verified? Yes   Copay amount $75   Copay form of payment Credit/debit on file   Delivery Date Selection 05/30/25   Signature Required No            Follow-Up: kenn Iyer CPhT  Pharmacy Care Coordinator, Endocrinology  5/29/2025  11:33 EDT

## 2025-06-11 ENCOUNTER — SPECIALTY PHARMACY (OUTPATIENT)
Age: 40
End: 2025-06-11
Payer: COMMERCIAL

## 2025-06-11 NOTE — PROGRESS NOTES
Specialty Pharmacy Patient Management Program  Endocrinology Refill Outreach      Lamar is a 39 y.o. female contacted today regarding refills of her medication(s).    Specialty medication(s) and dose(s) confirmed: Mounjaro.    Refill Questions      Flowsheet Row Most Recent Value   Changes to allergies? No   Changes to medications? No   New conditions or infections since last clinic visit No   Unplanned office visit, urgent care, ED, or hospital admission in the last 4 weeks  No   How does patient/caregiver feel medication is working? Very good   Financial problems or insurance changes  No   Since the previous refill, were any specialty medication doses or scheduled injections missed or delayed?  No   Does this patient require a clinical escalation to a pharmacist? No          Delivery Questions      Flowsheet Row Most Recent Value   Delivery method UPS   Delivery address verified with patient/caregiver? Yes   Delivery address Home   Number of medications in delivery 1   Medication(s) being filled and delivered Tirzepatide (Mounjaro)   Doses left of specialty medications 1   Copay verified? Yes   Copay amount $25   Copay form of payment Credit/debit on file   Delivery Date Selection 06/13/25   Signature Required No            Follow-Up: 28d    Tristen Iyer CPhT  Pharmacy Care Coordinator, Endocrinology  6/11/2025  13:08 EDT

## 2025-07-07 ENCOUNTER — SPECIALTY PHARMACY (OUTPATIENT)
Age: 40
End: 2025-07-07
Payer: COMMERCIAL

## 2025-07-07 NOTE — PROGRESS NOTES
Specialty Pharmacy Patient Management Program  Endocrinology Refill Outreach      Lamar is a 39 y.o. female contacted today regarding refills of her medication(s).    Specialty medication(s) and dose(s) confirmed: Mounjaro.    Refill Questions      Flowsheet Row Most Recent Value   Changes to allergies? No   Changes to medications? No   New conditions or infections since last clinic visit No   Unplanned office visit, urgent care, ED, or hospital admission in the last 4 weeks  No   How does patient/caregiver feel medication is working? Very good   Financial problems or insurance changes  No   Since the previous refill, were any specialty medication doses or scheduled injections missed or delayed?  No   Does this patient require a clinical escalation to a pharmacist? No          Delivery Questions      Flowsheet Row Most Recent Value   Delivery method UPS   Delivery address verified with patient/caregiver? Yes   Delivery address Home   Number of medications in delivery 1   Medication(s) being filled and delivered Tirzepatide (Mounjaro)   Doses left of specialty medications 1   Copay verified? Yes   Copay amount $25   Copay form of payment Credit/debit on file   Delivery Date Selection 07/09/25   Signature Required No            Follow-Up: 28d    Tristen Iyer CPhT  Pharmacy Care Coordinator, Endocrinology  7/7/2025  15:12 EDT

## 2025-07-29 ENCOUNTER — SPECIALTY PHARMACY (OUTPATIENT)
Age: 40
End: 2025-07-29
Payer: COMMERCIAL

## 2025-07-29 NOTE — PROGRESS NOTES
Specialty Pharmacy Patient Management Program  Endocrinology Refill Outreach      Lamar is a 39 y.o. female contacted today regarding refills of her medication(s).    Specialty medication(s) and dose(s) confirmed: Mounjaro.    Refill Questions      Flowsheet Row Most Recent Value   Changes to allergies? No   Changes to medications? No   New conditions or infections since last clinic visit No   Unplanned office visit, urgent care, ED, or hospital admission in the last 4 weeks  No   How does patient/caregiver feel medication is working? Very good   Financial problems or insurance changes  No   Since the previous refill, were any specialty medication doses or scheduled injections missed or delayed?  No   Does this patient require a clinical escalation to a pharmacist? No          Delivery Questions      Flowsheet Row Most Recent Value   Delivery method UPS   Delivery address verified with patient/caregiver? Yes   Delivery address Home   Number of medications in delivery 1   Medication(s) being filled and delivered Tirzepatide (Mounjaro)   Doses left of specialty medications 1   Copay verified? Yes   Copay amount $25   Copay form of payment Credit/debit on file   Delivery Date Selection 07/30/25   Signature Required No   Do you consent to receive electronic handouts?  Yes            Follow-Up: miranda Iyer CPhT  Pharmacy Care Coordinator, Endocrinology  7/29/2025  11:04 EDT

## 2025-08-06 ENCOUNTER — SPECIALTY PHARMACY (OUTPATIENT)
Dept: GENERAL RADIOLOGY | Facility: HOSPITAL | Age: 40
End: 2025-08-06
Payer: COMMERCIAL

## 2025-08-06 ENCOUNTER — OFFICE VISIT (OUTPATIENT)
Age: 40
End: 2025-08-06
Payer: COMMERCIAL

## 2025-08-06 VITALS
DIASTOLIC BLOOD PRESSURE: 76 MMHG | OXYGEN SATURATION: 98 % | HEIGHT: 61 IN | SYSTOLIC BLOOD PRESSURE: 122 MMHG | WEIGHT: 151 LBS | HEART RATE: 88 BPM | BODY MASS INDEX: 28.51 KG/M2

## 2025-08-06 DIAGNOSIS — E11.65 UNCONTROLLED TYPE 2 DIABETES MELLITUS WITH HYPERGLYCEMIA: Primary | ICD-10-CM

## 2025-08-06 LAB
EXPIRATION DATE: ABNORMAL
EXPIRATION DATE: NORMAL
GLUCOSE BLDC GLUCOMTR-MCNC: 105 MG/DL (ref 70–130)
HBA1C MFR BLD: 6.9 % (ref 4.5–5.7)
Lab: ABNORMAL
Lab: NORMAL

## 2025-08-06 RX ORDER — ESCITALOPRAM OXALATE 10 MG/1
10 TABLET ORAL DAILY
COMMUNITY
Start: 2025-07-21 | End: 2025-08-20

## 2025-08-20 ENCOUNTER — SPECIALTY PHARMACY (OUTPATIENT)
Age: 40
End: 2025-08-20
Payer: COMMERCIAL